# Patient Record
Sex: FEMALE | Race: WHITE | Employment: OTHER | URBAN - NONMETROPOLITAN AREA
[De-identification: names, ages, dates, MRNs, and addresses within clinical notes are randomized per-mention and may not be internally consistent; named-entity substitution may affect disease eponyms.]

---

## 2017-01-30 ENCOUNTER — DOCTOR'S OFFICE (OUTPATIENT)
Dept: URBAN - NONMETROPOLITAN AREA CLINIC 1 | Facility: CLINIC | Age: 72
Setting detail: OPHTHALMOLOGY
End: 2017-01-30
Payer: COMMERCIAL

## 2017-01-30 ENCOUNTER — DOCTOR'S OFFICE (OUTPATIENT)
Dept: URBAN - NONMETROPOLITAN AREA CLINIC 1 | Facility: CLINIC | Age: 72
Setting detail: OPHTHALMOLOGY
End: 2017-01-30

## 2017-01-30 DIAGNOSIS — H52.4: ICD-10-CM

## 2017-01-30 DIAGNOSIS — H43.811: ICD-10-CM

## 2017-01-30 DIAGNOSIS — H43.812: ICD-10-CM

## 2017-01-30 DIAGNOSIS — H25.13: ICD-10-CM

## 2017-01-30 DIAGNOSIS — D31.32: ICD-10-CM

## 2017-01-30 DIAGNOSIS — H04.122: ICD-10-CM

## 2017-01-30 DIAGNOSIS — H35.3131: ICD-10-CM

## 2017-01-30 DIAGNOSIS — H04.121: ICD-10-CM

## 2017-01-30 PROCEDURE — 92134 CPTRZ OPH DX IMG PST SGM RTA: CPT | Performed by: OPHTHALMOLOGY

## 2017-01-30 PROCEDURE — 92014 COMPRE OPH EXAM EST PT 1/>: CPT | Performed by: OPHTHALMOLOGY

## 2017-01-30 PROCEDURE — VITAEYE VITA EYE VITAMINS: Performed by: OPHTHALMOLOGY

## 2017-01-30 PROCEDURE — 92226 OPHTHALMOSCOPY EXT SUBSEQUENT: CPT | Performed by: OPHTHALMOLOGY

## 2017-01-30 ASSESSMENT — REFRACTION_CURRENTRX
OS_SPHERE: +3.00
OD_OVR_VA: 20/
OS_AXIS: 088
OS_OVR_VA: 20/
OS_ADD: +2.50
OD_OVR_VA: 20/
OD_AXIS: 106
OD_ADD: +2.50
OS_VPRISM_DIRECTION: PROGS
OD_CYLINDER: -2.50
OD_SPHERE: +3.25
OD_OVR_VA: 20/
OD_VPRISM_DIRECTION: PROGS
OS_CYLINDER: -2.50

## 2017-01-30 ASSESSMENT — REFRACTION_MANIFEST
OS_VA1: 20/
OS_VA3: 20/
OS_VA3: 20/
OS_VA1: 20/
OD_VA1: 20/
OU_VA: 20/
OD_VA3: 20/
OS_VA2: 20/
OD_VA2: 20/
OU_VA: 20/
OD_VA2: 20/
OS_VA3: 20/
OS_VA2: 20/
OD_VA2: 20/
OS_VA2: 20/
OD_VA1: 20/
OU_VA: 20/
OD_VA1: 20/
OS_VA1: 20/
OD_VA3: 20/
OD_VA3: 20/

## 2017-01-30 ASSESSMENT — VISUAL ACUITY
OS_BCVA: 20/30-2
OD_BCVA: 20/30+2

## 2017-01-30 ASSESSMENT — SUPERFICIAL PUNCTATE KERATITIS (SPK)
OD_SPK: 1+
OS_SPK: 1+

## 2017-01-30 ASSESSMENT — CONFRONTATIONAL VISUAL FIELD TEST (CVF)
OD_FINDINGS: FULL
OS_FINDINGS: FULL

## 2017-01-30 ASSESSMENT — LID POSITION - DERMATOCHALASIS
OS_DERMATOCHALASIS: LUL 1+
OD_DERMATOCHALASIS: RUL 1+

## 2017-04-04 ENCOUNTER — DOCTOR'S OFFICE (OUTPATIENT)
Dept: URBAN - NONMETROPOLITAN AREA CLINIC 1 | Facility: CLINIC | Age: 72
Setting detail: OPHTHALMOLOGY
End: 2017-04-04
Payer: COMMERCIAL

## 2017-04-04 DIAGNOSIS — I63.50: ICD-10-CM

## 2017-04-04 PROCEDURE — 92083 EXTENDED VISUAL FIELD XM: CPT | Performed by: OPHTHALMOLOGY

## 2017-04-27 ENCOUNTER — DOCTOR'S OFFICE (OUTPATIENT)
Dept: URBAN - NONMETROPOLITAN AREA CLINIC 1 | Facility: CLINIC | Age: 72
Setting detail: OPHTHALMOLOGY
End: 2017-04-27

## 2017-04-27 ENCOUNTER — DOCTOR'S OFFICE (OUTPATIENT)
Dept: URBAN - NONMETROPOLITAN AREA CLINIC 1 | Facility: CLINIC | Age: 72
Setting detail: OPHTHALMOLOGY
End: 2017-04-27
Payer: COMMERCIAL

## 2017-04-27 DIAGNOSIS — H35.3131: ICD-10-CM

## 2017-04-27 DIAGNOSIS — H04.121: ICD-10-CM

## 2017-04-27 DIAGNOSIS — H52.4: ICD-10-CM

## 2017-04-27 DIAGNOSIS — H04.122: ICD-10-CM

## 2017-04-27 DIAGNOSIS — I63.50: ICD-10-CM

## 2017-04-27 DIAGNOSIS — D31.32: ICD-10-CM

## 2017-04-27 DIAGNOSIS — H43.812: ICD-10-CM

## 2017-04-27 DIAGNOSIS — H43.811: ICD-10-CM

## 2017-04-27 DIAGNOSIS — H25.13: ICD-10-CM

## 2017-04-27 PROCEDURE — 92134 CPTRZ OPH DX IMG PST SGM RTA: CPT | Performed by: OPHTHALMOLOGY

## 2017-04-27 PROCEDURE — VITAEYE VITA EYE VITAMINS: Performed by: OPHTHALMOLOGY

## 2017-04-27 PROCEDURE — 92014 COMPRE OPH EXAM EST PT 1/>: CPT | Performed by: OPHTHALMOLOGY

## 2017-04-27 PROCEDURE — 92226 OPHTHALMOSCOPY EXT SUBSEQUENT: CPT | Performed by: OPHTHALMOLOGY

## 2017-04-27 ASSESSMENT — REFRACTION_CURRENTRX
OD_SPHERE: +3.25
OS_OVR_VA: 20/
OD_ADD: +2.50
OS_AXIS: 088
OS_CYLINDER: -2.50
OD_OVR_VA: 20/
OS_OVR_VA: 20/
OD_OVR_VA: 20/
OS_SPHERE: +3.00
OS_ADD: +2.50
OD_OVR_VA: 20/
OS_OVR_VA: 20/
OS_VPRISM_DIRECTION: PROGS
OD_VPRISM_DIRECTION: PROGS
OD_CYLINDER: -2.50
OD_AXIS: 106

## 2017-04-27 ASSESSMENT — REFRACTION_MANIFEST
OS_VA1: 20/
OD_VA1: 20/
OD_VA1: 20/
OS_VA3: 20/
OD_VA2: 20/
OD_VA3: 20/
OS_VA3: 20/
OU_VA: 20/
OD_VA3: 20/
OS_VA1: 20/
OS_VA1: 20/
OU_VA: 20/
OD_VA1: 20/
OD_VA2: 20/
OU_VA: 20/
OS_VA2: 20/
OS_VA3: 20/
OS_VA2: 20/
OD_VA2: 20/
OD_VA3: 20/
OS_VA2: 20/

## 2017-04-27 ASSESSMENT — CONFRONTATIONAL VISUAL FIELD TEST (CVF)
OD_FINDINGS: FULL
OS_FINDINGS: FULL

## 2017-04-27 ASSESSMENT — VISUAL ACUITY
OS_BCVA: 20/25-2
OD_BCVA: 20/25-2

## 2017-04-27 ASSESSMENT — LID POSITION - DERMATOCHALASIS
OS_DERMATOCHALASIS: LUL 1+
OD_DERMATOCHALASIS: RUL 1+

## 2017-04-27 ASSESSMENT — SUPERFICIAL PUNCTATE KERATITIS (SPK)
OS_SPK: 1+
OD_SPK: 1+

## 2017-08-21 ENCOUNTER — DOCTOR'S OFFICE (OUTPATIENT)
Dept: URBAN - NONMETROPOLITAN AREA CLINIC 1 | Facility: CLINIC | Age: 72
Setting detail: OPHTHALMOLOGY
End: 2017-08-21

## 2017-08-21 DIAGNOSIS — H52.4: ICD-10-CM

## 2017-08-21 PROCEDURE — VITAEYE VITA EYE VITAMINS: Performed by: OPHTHALMOLOGY

## 2017-08-24 ENCOUNTER — DOCTOR'S OFFICE (OUTPATIENT)
Dept: URBAN - NONMETROPOLITAN AREA CLINIC 1 | Facility: CLINIC | Age: 72
Setting detail: OPHTHALMOLOGY
End: 2017-08-24
Payer: COMMERCIAL

## 2017-08-24 DIAGNOSIS — H43.813: ICD-10-CM

## 2017-08-24 DIAGNOSIS — H43.812: ICD-10-CM

## 2017-08-24 DIAGNOSIS — H04.123: ICD-10-CM

## 2017-08-24 DIAGNOSIS — H35.3131: ICD-10-CM

## 2017-08-24 DIAGNOSIS — H04.122: ICD-10-CM

## 2017-08-24 DIAGNOSIS — H25.13: ICD-10-CM

## 2017-08-24 DIAGNOSIS — D31.32: ICD-10-CM

## 2017-08-24 DIAGNOSIS — H04.121: ICD-10-CM

## 2017-08-24 DIAGNOSIS — H43.811: ICD-10-CM

## 2017-08-24 DIAGNOSIS — I63.50: ICD-10-CM

## 2017-08-24 PROCEDURE — 83861 MICROFLUID ANALY TEARS: CPT | Performed by: OPHTHALMOLOGY

## 2017-08-24 PROCEDURE — 92014 COMPRE OPH EXAM EST PT 1/>: CPT | Performed by: OPHTHALMOLOGY

## 2017-08-24 PROCEDURE — 92134 CPTRZ OPH DX IMG PST SGM RTA: CPT | Performed by: OPHTHALMOLOGY

## 2017-08-24 PROCEDURE — 92226 OPHTHALMOSCOPY EXT SUBSEQUENT: CPT | Performed by: OPHTHALMOLOGY

## 2017-08-24 ASSESSMENT — REFRACTION_CURRENTRX
OS_VPRISM_DIRECTION: PROGS
OD_OVR_VA: 20/
OS_SPHERE: +3.00
OS_OVR_VA: 20/
OD_ADD: +2.50
OS_CYLINDER: -2.50
OD_VPRISM_DIRECTION: PROGS
OS_ADD: +2.50
OS_OVR_VA: 20/
OS_OVR_VA: 20/
OD_SPHERE: +3.25
OS_AXIS: 088
OD_AXIS: 106
OD_CYLINDER: -2.50

## 2017-08-24 ASSESSMENT — REFRACTION_MANIFEST
OU_VA: 20/
OD_VA2: 20/
OS_VA1: 20/
OS_VA3: 20/
OD_VA2: 20/
OS_VA2: 20/
OS_VA3: 20/
OD_VA2: 20/
OD_VA1: 20/
OS_VA2: 20/
OS_VA1: 20/
OD_VA3: 20/
OS_VA1: 20/
OU_VA: 20/
OD_VA3: 20/
OU_VA: 20/
OS_VA2: 20/
OD_VA1: 20/
OD_VA1: 20/
OS_VA3: 20/
OD_VA3: 20/

## 2017-08-24 ASSESSMENT — LID POSITION - DERMATOCHALASIS
OS_DERMATOCHALASIS: LUL 1+
OD_DERMATOCHALASIS: RUL 1+

## 2017-08-24 ASSESSMENT — CONFRONTATIONAL VISUAL FIELD TEST (CVF)
OS_FINDINGS: FULL
OD_FINDINGS: FULL

## 2017-08-24 ASSESSMENT — VISUAL ACUITY
OS_BCVA: 20/25-2
OD_BCVA: 20/25-1

## 2017-08-24 ASSESSMENT — SUPERFICIAL PUNCTATE KERATITIS (SPK)
OD_SPK: 1+
OS_SPK: 1+

## 2017-11-22 ENCOUNTER — DOCTOR'S OFFICE (OUTPATIENT)
Dept: URBAN - METROPOLITAN AREA CLINIC 136 | Facility: CLINIC | Age: 72
Setting detail: OPHTHALMOLOGY
End: 2017-11-22
Payer: COMMERCIAL

## 2017-11-22 DIAGNOSIS — H35.3130: ICD-10-CM

## 2017-11-22 PROCEDURE — VITAEYE VITA EYE VITAMINS: Performed by: OPHTHALMOLOGY

## 2017-12-14 ENCOUNTER — DOCTOR'S OFFICE (OUTPATIENT)
Dept: URBAN - NONMETROPOLITAN AREA CLINIC 1 | Facility: CLINIC | Age: 72
Setting detail: OPHTHALMOLOGY
End: 2017-12-14
Payer: COMMERCIAL

## 2017-12-14 DIAGNOSIS — D31.32: ICD-10-CM

## 2017-12-14 DIAGNOSIS — H43.813: ICD-10-CM

## 2017-12-14 DIAGNOSIS — H25.13: ICD-10-CM

## 2017-12-14 DIAGNOSIS — H35.3131: ICD-10-CM

## 2017-12-14 DIAGNOSIS — H04.122: ICD-10-CM

## 2017-12-14 DIAGNOSIS — H43.812: ICD-10-CM

## 2017-12-14 DIAGNOSIS — H43.811: ICD-10-CM

## 2017-12-14 DIAGNOSIS — H04.121: ICD-10-CM

## 2017-12-14 DIAGNOSIS — I63.50: ICD-10-CM

## 2017-12-14 DIAGNOSIS — H04.123: ICD-10-CM

## 2017-12-14 PROCEDURE — 92014 COMPRE OPH EXAM EST PT 1/>: CPT | Performed by: OPHTHALMOLOGY

## 2017-12-14 PROCEDURE — 83861 MICROFLUID ANALY TEARS: CPT | Performed by: OPHTHALMOLOGY

## 2017-12-14 PROCEDURE — 92226 OPHTHALMOSCOPY EXT SUBSEQUENT: CPT | Performed by: OPHTHALMOLOGY

## 2017-12-14 PROCEDURE — 92134 CPTRZ OPH DX IMG PST SGM RTA: CPT | Performed by: OPHTHALMOLOGY

## 2017-12-14 ASSESSMENT — REFRACTION_MANIFEST
OS_VA3: 20/
OS_VA3: 20/
OS_VA1: 20/
OD_VA3: 20/
OD_VA2: 20/
OD_VA1: 20/
OS_VA3: 20/
OS_VA1: 20/
OD_VA1: 20/
OS_VA1: 20/
OU_VA: 20/
OS_VA2: 20/
OS_VA2: 20/
OD_VA1: 20/
OU_VA: 20/
OD_VA2: 20/
OD_VA2: 20/
OD_VA3: 20/
OD_VA3: 20/
OU_VA: 20/
OS_VA2: 20/

## 2017-12-14 ASSESSMENT — REFRACTION_CURRENTRX
OS_AXIS: 088
OS_CYLINDER: -2.50
OS_VPRISM_DIRECTION: PROGS
OS_ADD: +2.50
OD_OVR_VA: 20/
OS_OVR_VA: 20/
OD_VPRISM_DIRECTION: PROGS
OD_OVR_VA: 20/
OD_SPHERE: +3.25
OD_CYLINDER: -2.50
OS_OVR_VA: 20/
OD_ADD: +2.50
OS_OVR_VA: 20/
OD_OVR_VA: 20/
OS_SPHERE: +3.00
OD_AXIS: 106

## 2017-12-14 ASSESSMENT — VISUAL ACUITY
OS_BCVA: 20/40-1
OD_BCVA: 20/40

## 2017-12-14 ASSESSMENT — LID POSITION - DERMATOCHALASIS
OD_DERMATOCHALASIS: RUL 1+
OS_DERMATOCHALASIS: LUL 1+

## 2017-12-14 ASSESSMENT — SUPERFICIAL PUNCTATE KERATITIS (SPK)
OD_SPK: 1+
OS_SPK: 1+

## 2017-12-14 ASSESSMENT — CONFRONTATIONAL VISUAL FIELD TEST (CVF)
OD_FINDINGS: FULL
OS_FINDINGS: FULL

## 2017-12-29 ENCOUNTER — DOCTOR'S OFFICE (OUTPATIENT)
Dept: URBAN - NONMETROPOLITAN AREA CLINIC 1 | Facility: CLINIC | Age: 72
Setting detail: OPHTHALMOLOGY
End: 2017-12-29
Payer: COMMERCIAL

## 2017-12-29 DIAGNOSIS — I63.50: ICD-10-CM

## 2017-12-29 PROCEDURE — 92083 EXTENDED VISUAL FIELD XM: CPT | Performed by: OPHTHALMOLOGY

## 2018-01-18 ENCOUNTER — DOCTOR'S OFFICE (OUTPATIENT)
Dept: URBAN - NONMETROPOLITAN AREA CLINIC 1 | Facility: CLINIC | Age: 73
Setting detail: OPHTHALMOLOGY
End: 2018-01-18

## 2018-01-18 ENCOUNTER — DOCTOR'S OFFICE (OUTPATIENT)
Dept: URBAN - NONMETROPOLITAN AREA CLINIC 1 | Facility: CLINIC | Age: 73
Setting detail: OPHTHALMOLOGY
End: 2018-01-18
Payer: COMMERCIAL

## 2018-01-18 DIAGNOSIS — H35.3131: ICD-10-CM

## 2018-01-18 DIAGNOSIS — H52.7: ICD-10-CM

## 2018-01-18 DIAGNOSIS — H04.122: ICD-10-CM

## 2018-01-18 DIAGNOSIS — H04.121: ICD-10-CM

## 2018-01-18 DIAGNOSIS — I63.50: ICD-10-CM

## 2018-01-18 DIAGNOSIS — D31.32: ICD-10-CM

## 2018-01-18 DIAGNOSIS — H25.13: ICD-10-CM

## 2018-01-18 PROCEDURE — 83861 MICROFLUID ANALY TEARS: CPT | Performed by: OPHTHALMOLOGY

## 2018-01-18 PROCEDURE — 92134 CPTRZ OPH DX IMG PST SGM RTA: CPT | Performed by: OPHTHALMOLOGY

## 2018-01-18 PROCEDURE — 92014 COMPRE OPH EXAM EST PT 1/>: CPT | Performed by: OPHTHALMOLOGY

## 2018-01-18 PROCEDURE — VITAEYE VITA EYE VITAMINS: Performed by: OPHTHALMOLOGY

## 2018-01-18 ASSESSMENT — LID POSITION - DERMATOCHALASIS
OD_DERMATOCHALASIS: RUL 1+
OS_DERMATOCHALASIS: LUL 1+

## 2018-01-18 ASSESSMENT — REFRACTION_MANIFEST
OD_VA1: 20/
OD_VA3: 20/
OU_VA: 20/
OD_VA3: 20/
OS_VA1: 20/
OD_VA3: 20/
OU_VA: 20/
OS_VA3: 20/
OU_VA: 20/
OD_VA1: 20/
OD_VA2: 20/
OS_VA2: 20/
OS_VA3: 20/
OD_VA1: 20/
OS_VA2: 20/
OD_VA2: 20/
OS_VA3: 20/
OS_VA2: 20/
OD_VA2: 20/

## 2018-01-18 ASSESSMENT — REFRACTION_CURRENTRX
OD_ADD: +2.50
OS_SPHERE: +3.00
OS_OVR_VA: 20/
OS_OVR_VA: 20/
OD_AXIS: 106
OS_OVR_VA: 20/
OD_VPRISM_DIRECTION: PROGS
OS_ADD: +2.50
OD_CYLINDER: -2.50
OS_VPRISM_DIRECTION: PROGS
OD_OVR_VA: 20/
OD_SPHERE: +3.25
OD_OVR_VA: 20/
OS_CYLINDER: -2.50
OS_AXIS: 088
OD_OVR_VA: 20/

## 2018-01-18 ASSESSMENT — CONFRONTATIONAL VISUAL FIELD TEST (CVF)
OS_FINDINGS: FULL
OD_FINDINGS: FULL

## 2018-01-18 ASSESSMENT — SUPERFICIAL PUNCTATE KERATITIS (SPK)
OS_SPK: 1+
OD_SPK: 1+

## 2018-01-18 ASSESSMENT — VISUAL ACUITY
OD_BCVA: 20/30
OS_BCVA: 20/30

## 2018-04-16 ENCOUNTER — DOCTOR'S OFFICE (OUTPATIENT)
Dept: URBAN - NONMETROPOLITAN AREA CLINIC 1 | Facility: CLINIC | Age: 73
Setting detail: OPHTHALMOLOGY
End: 2018-04-16
Payer: COMMERCIAL

## 2018-04-16 ENCOUNTER — RX ONLY (RX ONLY)
Age: 73
End: 2018-04-16

## 2018-04-16 DIAGNOSIS — I63.50: ICD-10-CM

## 2018-04-16 DIAGNOSIS — H43.812: ICD-10-CM

## 2018-04-16 DIAGNOSIS — H35.3131: ICD-10-CM

## 2018-04-16 DIAGNOSIS — H43.813: ICD-10-CM

## 2018-04-16 DIAGNOSIS — E11.3293: ICD-10-CM

## 2018-04-16 DIAGNOSIS — D31.32: ICD-10-CM

## 2018-04-16 DIAGNOSIS — I63.9: ICD-10-CM

## 2018-04-16 DIAGNOSIS — H04.123: ICD-10-CM

## 2018-04-16 DIAGNOSIS — H43.811: ICD-10-CM

## 2018-04-16 DIAGNOSIS — H25.13: ICD-10-CM

## 2018-04-16 DIAGNOSIS — H04.122: ICD-10-CM

## 2018-04-16 DIAGNOSIS — H04.121: ICD-10-CM

## 2018-04-16 PROCEDURE — 83861 MICROFLUID ANALY TEARS: CPT | Performed by: OPHTHALMOLOGY

## 2018-04-16 PROCEDURE — 92014 COMPRE OPH EXAM EST PT 1/>: CPT | Performed by: OPHTHALMOLOGY

## 2018-04-16 PROCEDURE — 92134 CPTRZ OPH DX IMG PST SGM RTA: CPT | Performed by: OPHTHALMOLOGY

## 2018-04-16 PROCEDURE — 92226 OPHTHALMOSCOPY EXT SUBSEQUENT: CPT | Performed by: OPHTHALMOLOGY

## 2018-04-16 ASSESSMENT — CONFRONTATIONAL VISUAL FIELD TEST (CVF)
OS_FINDINGS: FULL
OD_FINDINGS: FULL

## 2018-04-16 ASSESSMENT — REFRACTION_CURRENTRX
OD_AXIS: 106
OS_OVR_VA: 20/
OS_ADD: +2.50
OD_OVR_VA: 20/
OS_VPRISM_DIRECTION: PROGS
OD_VPRISM_DIRECTION: PROGS
OD_ADD: +2.50
OD_SPHERE: +3.25
OD_OVR_VA: 20/
OS_CYLINDER: -2.50
OS_OVR_VA: 20/
OS_OVR_VA: 20/
OD_OVR_VA: 20/
OS_SPHERE: +3.00
OS_AXIS: 088
OD_CYLINDER: -2.50

## 2018-04-16 ASSESSMENT — REFRACTION_MANIFEST
OS_VA3: 20/
OD_VA1: 20/
OS_VA1: 20/
OD_VA3: 20/
OS_VA2: 20/
OD_VA3: 20/
OS_VA1: 20/
OU_VA: 20/
OS_VA2: 20/
OS_VA3: 20/
OU_VA: 20/
OD_VA1: 20/
OS_VA2: 20/
OD_VA3: 20/
OD_VA2: 20/
OD_VA2: 20/
OS_VA1: 20/
OS_VA3: 20/
OD_VA2: 20/
OU_VA: 20/
OD_VA1: 20/

## 2018-04-16 ASSESSMENT — VISUAL ACUITY
OD_BCVA: 20/25-1
OS_BCVA: 20/25-2

## 2018-04-16 ASSESSMENT — SUPERFICIAL PUNCTATE KERATITIS (SPK)
OS_SPK: 1+
OD_SPK: 1+

## 2018-04-16 ASSESSMENT — LID POSITION - DERMATOCHALASIS
OD_DERMATOCHALASIS: RUL 1+
OS_DERMATOCHALASIS: LUL 1+

## 2018-06-01 ENCOUNTER — DOCTOR'S OFFICE (OUTPATIENT)
Dept: URBAN - NONMETROPOLITAN AREA CLINIC 1 | Facility: CLINIC | Age: 73
Setting detail: OPHTHALMOLOGY
End: 2018-06-01

## 2018-06-01 DIAGNOSIS — H52.7: ICD-10-CM

## 2018-06-01 PROCEDURE — VITAEYE VITA EYE VITAMINS: Performed by: OPHTHALMOLOGY

## 2018-07-12 ENCOUNTER — DOCTOR'S OFFICE (OUTPATIENT)
Dept: URBAN - NONMETROPOLITAN AREA CLINIC 1 | Facility: CLINIC | Age: 73
Setting detail: OPHTHALMOLOGY
End: 2018-07-12
Payer: COMMERCIAL

## 2018-07-12 DIAGNOSIS — H43.812: ICD-10-CM

## 2018-07-12 DIAGNOSIS — H43.813: ICD-10-CM

## 2018-07-12 DIAGNOSIS — E11.3292: ICD-10-CM

## 2018-07-12 DIAGNOSIS — E11.3293: ICD-10-CM

## 2018-07-12 DIAGNOSIS — H04.121: ICD-10-CM

## 2018-07-12 DIAGNOSIS — H25.13: ICD-10-CM

## 2018-07-12 DIAGNOSIS — H43.811: ICD-10-CM

## 2018-07-12 DIAGNOSIS — H04.122: ICD-10-CM

## 2018-07-12 DIAGNOSIS — H35.3131: ICD-10-CM

## 2018-07-12 DIAGNOSIS — I63.9: ICD-10-CM

## 2018-07-12 DIAGNOSIS — H04.123: ICD-10-CM

## 2018-07-12 DIAGNOSIS — D31.32: ICD-10-CM

## 2018-07-12 PROCEDURE — 92226 OPHTHALMOSCOPY EXT SUBSEQUENT: CPT | Performed by: OPHTHALMOLOGY

## 2018-07-12 PROCEDURE — 92014 COMPRE OPH EXAM EST PT 1/>: CPT | Performed by: OPHTHALMOLOGY

## 2018-07-12 PROCEDURE — 92134 CPTRZ OPH DX IMG PST SGM RTA: CPT | Performed by: OPHTHALMOLOGY

## 2018-07-12 PROCEDURE — 83861 MICROFLUID ANALY TEARS: CPT | Performed by: OPHTHALMOLOGY

## 2018-07-12 ASSESSMENT — REFRACTION_MANIFEST
OD_VA2: 20/
OU_VA: 20/
OS_VA1: 20/
OS_VA2: 20/
OD_VA1: 20/
OD_VA3: 20/
OU_VA: 20/
OS_VA3: 20/
OD_VA3: 20/
OS_VA3: 20/
OD_VA1: 20/
OS_VA3: 20/
OS_VA1: 20/
OS_VA2: 20/
OD_VA2: 20/
OD_VA3: 20/
OD_VA2: 20/
OS_VA2: 20/
OS_VA1: 20/
OU_VA: 20/
OD_VA1: 20/

## 2018-07-12 ASSESSMENT — REFRACTION_CURRENTRX
OD_AXIS: 106
OD_OVR_VA: 20/
OS_OVR_VA: 20/
OS_SPHERE: +3.00
OS_VPRISM_DIRECTION: PROGS
OS_OVR_VA: 20/
OD_OVR_VA: 20/
OS_ADD: +2.50
OS_CYLINDER: -2.50
OS_OVR_VA: 20/
OD_OVR_VA: 20/
OD_ADD: +2.50
OS_AXIS: 088
OD_SPHERE: +3.25
OD_VPRISM_DIRECTION: PROGS
OD_CYLINDER: -2.50

## 2018-07-12 ASSESSMENT — VISUAL ACUITY
OD_BCVA: 20/40+2
OS_BCVA: 20/30+2

## 2018-07-12 ASSESSMENT — SUPERFICIAL PUNCTATE KERATITIS (SPK)
OD_SPK: 1+
OS_SPK: 1+

## 2018-07-12 ASSESSMENT — LID POSITION - DERMATOCHALASIS
OS_DERMATOCHALASIS: LUL 1+
OD_DERMATOCHALASIS: RUL 1+

## 2018-07-12 ASSESSMENT — CONFRONTATIONAL VISUAL FIELD TEST (CVF)
OD_FINDINGS: FULL
OS_FINDINGS: FULL

## 2018-08-23 ENCOUNTER — DOCTOR'S OFFICE (OUTPATIENT)
Dept: URBAN - NONMETROPOLITAN AREA CLINIC 1 | Facility: CLINIC | Age: 73
Setting detail: OPHTHALMOLOGY
End: 2018-08-23
Payer: COMMERCIAL

## 2018-08-23 DIAGNOSIS — H43.812: ICD-10-CM

## 2018-08-23 DIAGNOSIS — H04.121: ICD-10-CM

## 2018-08-23 DIAGNOSIS — H25.13: ICD-10-CM

## 2018-08-23 DIAGNOSIS — D31.32: ICD-10-CM

## 2018-08-23 DIAGNOSIS — H43.813: ICD-10-CM

## 2018-08-23 DIAGNOSIS — H04.122: ICD-10-CM

## 2018-08-23 DIAGNOSIS — H35.3131: ICD-10-CM

## 2018-08-23 DIAGNOSIS — H43.811: ICD-10-CM

## 2018-08-23 DIAGNOSIS — I63.9: ICD-10-CM

## 2018-08-23 DIAGNOSIS — E11.3293: ICD-10-CM

## 2018-08-23 DIAGNOSIS — H04.123: ICD-10-CM

## 2018-08-23 PROCEDURE — 83861 MICROFLUID ANALY TEARS: CPT | Performed by: OPHTHALMOLOGY

## 2018-08-23 PROCEDURE — 92014 COMPRE OPH EXAM EST PT 1/>: CPT | Performed by: OPHTHALMOLOGY

## 2018-08-23 PROCEDURE — 92134 CPTRZ OPH DX IMG PST SGM RTA: CPT | Performed by: OPHTHALMOLOGY

## 2018-08-23 PROCEDURE — 92226 OPHTHALMOSCOPY EXT SUBSEQUENT: CPT | Performed by: OPHTHALMOLOGY

## 2018-08-23 ASSESSMENT — REFRACTION_MANIFEST
OS_VA2: 20/
OD_VA1: 20/
OU_VA: 20/
OS_VA2: 20/
OD_VA1: 20/
OD_VA2: 20/
OS_VA1: 20/
OD_VA2: 20/
OD_VA3: 20/
OS_VA3: 20/
OD_VA2: 20/
OS_VA1: 20/
OS_VA3: 20/
OS_VA2: 20/
OU_VA: 20/
OD_VA3: 20/
OS_VA3: 20/
OD_VA1: 20/
OU_VA: 20/
OS_VA1: 20/
OD_VA3: 20/

## 2018-08-23 ASSESSMENT — REFRACTION_CURRENTRX
OS_CYLINDER: -2.50
OS_VPRISM_DIRECTION: PROGS
OS_OVR_VA: 20/
OS_OVR_VA: 20/
OD_OVR_VA: 20/
OD_CYLINDER: -2.50
OD_OVR_VA: 20/
OS_OVR_VA: 20/
OD_SPHERE: +3.25
OD_AXIS: 106
OS_SPHERE: +3.00
OS_ADD: +2.50
OD_ADD: +2.50
OS_AXIS: 088
OD_VPRISM_DIRECTION: PROGS
OD_OVR_VA: 20/

## 2018-08-23 ASSESSMENT — LID POSITION - DERMATOCHALASIS
OS_DERMATOCHALASIS: LUL 1+
OD_DERMATOCHALASIS: RUL 1+

## 2018-08-23 ASSESSMENT — CONFRONTATIONAL VISUAL FIELD TEST (CVF)
OD_FINDINGS: FULL
OS_FINDINGS: FULL

## 2018-08-23 ASSESSMENT — DECREASING TEAR LAKE - SEVERITY SCORE
OS_DEC_TEARLAKE: 2+
OD_DEC_TEARLAKE: 2+

## 2018-08-23 ASSESSMENT — SUPERFICIAL PUNCTATE KERATITIS (SPK)
OD_SPK: 1+
OS_SPK: 1+

## 2018-08-23 ASSESSMENT — VISUAL ACUITY
OD_BCVA: 20/25-1
OS_BCVA: 20/30+2

## 2018-09-24 ENCOUNTER — RX ONLY (RX ONLY)
Age: 73
End: 2018-09-24

## 2018-09-24 ENCOUNTER — DOCTOR'S OFFICE (OUTPATIENT)
Dept: URBAN - NONMETROPOLITAN AREA CLINIC 1 | Facility: CLINIC | Age: 73
Setting detail: OPHTHALMOLOGY
End: 2018-09-24
Payer: COMMERCIAL

## 2018-09-24 DIAGNOSIS — E11.3293: ICD-10-CM

## 2018-09-24 DIAGNOSIS — H04.121: ICD-10-CM

## 2018-09-24 DIAGNOSIS — H04.123: ICD-10-CM

## 2018-09-24 DIAGNOSIS — H35.3131: ICD-10-CM

## 2018-09-24 DIAGNOSIS — H04.122: ICD-10-CM

## 2018-09-24 DIAGNOSIS — H25.13: ICD-10-CM

## 2018-09-24 PROCEDURE — 83861 MICROFLUID ANALY TEARS: CPT | Performed by: OPHTHALMOLOGY

## 2018-09-24 PROCEDURE — 92012 INTRM OPH EXAM EST PATIENT: CPT | Performed by: OPHTHALMOLOGY

## 2018-09-24 PROCEDURE — 92134 CPTRZ OPH DX IMG PST SGM RTA: CPT | Performed by: OPHTHALMOLOGY

## 2018-09-24 ASSESSMENT — REFRACTION_MANIFEST
OS_VA2: 20/
OD_VA3: 20/
OU_VA: 20/
OD_VA1: 20/
OD_VA2: 20/
OS_VA3: 20/
OS_VA1: 20/
OD_VA1: 20/
OS_VA2: 20/
OD_VA3: 20/
OS_VA1: 20/
OD_VA2: 20/
OS_VA3: 20/
OU_VA: 20/

## 2018-09-24 ASSESSMENT — REFRACTION_CURRENTRX
OD_VPRISM_DIRECTION: PROGS
OD_AXIS: 106
OD_SPHERE: +3.25
OS_VPRISM_DIRECTION: PROGS
OS_CYLINDER: -2.50
OD_ADD: +2.50
OD_OVR_VA: 20/
OS_AXIS: 088
OD_CYLINDER: -2.50
OD_OVR_VA: 20/
OS_OVR_VA: 20/
OD_OVR_VA: 20/
OS_OVR_VA: 20/
OS_OVR_VA: 20/
OS_SPHERE: +3.00
OS_ADD: +2.50

## 2018-09-24 ASSESSMENT — DECREASING TEAR LAKE - SEVERITY SCORE
OD_DEC_TEARLAKE: 2+
OS_DEC_TEARLAKE: 2+

## 2018-09-24 ASSESSMENT — CONFRONTATIONAL VISUAL FIELD TEST (CVF)
OD_FINDINGS: FULL
OS_FINDINGS: FULL

## 2018-09-24 ASSESSMENT — LID POSITION - DERMATOCHALASIS
OD_DERMATOCHALASIS: RUL 1+
OS_DERMATOCHALASIS: LUL 1+

## 2018-09-24 ASSESSMENT — VISUAL ACUITY
OS_BCVA: 20/40+2
OD_BCVA: 20/25-1

## 2018-09-24 ASSESSMENT — SUPERFICIAL PUNCTATE KERATITIS (SPK)
OD_SPK: 1+
OS_SPK: 1+

## 2018-10-24 ENCOUNTER — DOCTOR'S OFFICE (OUTPATIENT)
Dept: URBAN - NONMETROPOLITAN AREA CLINIC 1 | Facility: CLINIC | Age: 73
Setting detail: OPHTHALMOLOGY
End: 2018-10-24
Payer: COMMERCIAL

## 2018-10-24 DIAGNOSIS — H04.121: ICD-10-CM

## 2018-10-24 DIAGNOSIS — H04.122: ICD-10-CM

## 2018-10-24 PROCEDURE — 99214 OFFICE O/P EST MOD 30 MIN: CPT | Performed by: PHYSICIAN ASSISTANT

## 2018-10-24 PROCEDURE — 83861 MICROFLUID ANALY TEARS: CPT | Performed by: PHYSICIAN ASSISTANT

## 2018-10-24 ASSESSMENT — LID EXAM ASSESSMENTS
OD_BLEPHARITIS: ABSENT
OS_BLEPHARITIS: ABSENT

## 2018-10-24 ASSESSMENT — CONFRONTATIONAL VISUAL FIELD TEST (CVF)
OS_FINDINGS: FULL
OD_FINDINGS: FULL

## 2018-10-24 ASSESSMENT — LID POSITION - DERMATOCHALASIS
OD_DERMATOCHALASIS: RUL 1+
OS_DERMATOCHALASIS: LUL 1+

## 2018-10-24 ASSESSMENT — SUPERFICIAL PUNCTATE KERATITIS (SPK)
OD_SPK: T
OS_SPK: T

## 2018-10-24 ASSESSMENT — DECREASING TEAR LAKE - SEVERITY SCORE
OS_DEC_TEARLAKE: 2+
OD_DEC_TEARLAKE: 2+

## 2018-10-24 ASSESSMENT — DRY EYES - PHYSICIAN NOTES
OD_GENERALCOMMENTS: INFERIOR
OS_GENERALCOMMENTS: DIFFUSE

## 2018-10-29 ASSESSMENT — REFRACTION_MANIFEST
OD_VA1: 20/
OD_VA1: 20/
OS_VA3: 20/
OU_VA: 20/
OD_VA3: 20/
OU_VA: 20/
OS_VA3: 20/
OS_VA1: 20/
OD_VA2: 20/
OS_VA1: 20/
OD_VA3: 20/
OS_VA2: 20/
OD_VA2: 20/
OS_VA2: 20/

## 2018-10-29 ASSESSMENT — REFRACTION_CURRENTRX
OS_OVR_VA: 20/
OS_ADD: +2.50
OD_AXIS: 106
OS_OVR_VA: 20/
OD_SPHERE: +3.25
OD_OVR_VA: 20/
OD_CYLINDER: -2.50
OS_SPHERE: +3.00
OD_VPRISM_DIRECTION: PROGS
OS_CYLINDER: -2.50
OD_OVR_VA: 20/
OD_ADD: +2.50
OS_OVR_VA: 20/
OS_AXIS: 088
OS_VPRISM_DIRECTION: PROGS
OD_OVR_VA: 20/

## 2018-10-29 ASSESSMENT — VISUAL ACUITY
OD_BCVA: 20/25-2
OS_BCVA: 20/30-2

## 2018-11-28 ENCOUNTER — DOCTOR'S OFFICE (OUTPATIENT)
Dept: URBAN - NONMETROPOLITAN AREA CLINIC 1 | Facility: CLINIC | Age: 73
Setting detail: OPHTHALMOLOGY
End: 2018-11-28
Payer: COMMERCIAL

## 2018-11-28 DIAGNOSIS — H04.122: ICD-10-CM

## 2018-11-28 DIAGNOSIS — H04.121: ICD-10-CM

## 2018-11-28 DIAGNOSIS — H04.123: ICD-10-CM

## 2018-11-28 PROCEDURE — 83861 MICROFLUID ANALY TEARS: CPT | Performed by: PHYSICIAN ASSISTANT

## 2018-11-28 PROCEDURE — 99214 OFFICE O/P EST MOD 30 MIN: CPT | Performed by: PHYSICIAN ASSISTANT

## 2018-11-28 ASSESSMENT — LID POSITION - DERMATOCHALASIS
OD_DERMATOCHALASIS: RUL 1+
OS_DERMATOCHALASIS: LUL 1+

## 2018-11-28 ASSESSMENT — LID EXAM ASSESSMENTS
OS_BLEPHARITIS: ABSENT
OD_BLEPHARITIS: ABSENT

## 2018-11-28 ASSESSMENT — DRY EYES - PHYSICIAN NOTES
OS_GENERALCOMMENTS: INFERIOR
OD_GENERALCOMMENTS: INFERIOR

## 2018-11-28 ASSESSMENT — SUPERFICIAL PUNCTATE KERATITIS (SPK)
OS_SPK: T 1+
OD_SPK: 1+

## 2018-11-28 ASSESSMENT — DECREASING TEAR LAKE - SEVERITY SCORE
OD_DEC_TEARLAKE: 2+
OS_DEC_TEARLAKE: 2+

## 2018-11-29 ASSESSMENT — REFRACTION_MANIFEST
OS_VA3: 20/
OS_VA1: 20/
OU_VA: 20/
OS_VA2: 20/
OS_VA2: 20/
OD_VA2: 20/
OS_VA3: 20/
OU_VA: 20/
OD_VA3: 20/
OD_VA1: 20/
OD_VA1: 20/
OD_VA2: 20/
OS_VA1: 20/
OD_VA3: 20/

## 2018-11-29 ASSESSMENT — REFRACTION_CURRENTRX
OS_OVR_VA: 20/
OD_OVR_VA: 20/
OS_ADD: +2.50
OS_OVR_VA: 20/
OS_AXIS: 088
OD_ADD: +2.50
OS_SPHERE: +3.00
OD_CYLINDER: -2.50
OS_VPRISM_DIRECTION: PROGS
OD_OVR_VA: 20/
OD_OVR_VA: 20/
OD_SPHERE: +3.25
OD_VPRISM_DIRECTION: PROGS
OS_CYLINDER: -2.50
OS_OVR_VA: 20/
OD_AXIS: 106

## 2018-11-29 ASSESSMENT — VISUAL ACUITY
OD_BCVA: 20/30+2
OS_BCVA: 20/30-1

## 2019-05-29 ENCOUNTER — DOCTOR'S OFFICE (OUTPATIENT)
Dept: URBAN - NONMETROPOLITAN AREA CLINIC 1 | Facility: CLINIC | Age: 74
Setting detail: OPHTHALMOLOGY
End: 2019-05-29
Payer: COMMERCIAL

## 2019-05-29 DIAGNOSIS — H04.121: ICD-10-CM

## 2019-05-29 DIAGNOSIS — H04.123: ICD-10-CM

## 2019-05-29 DIAGNOSIS — H04.122: ICD-10-CM

## 2019-05-29 PROCEDURE — 83861 MICROFLUID ANALY TEARS: CPT | Performed by: PHYSICIAN ASSISTANT

## 2019-05-29 PROCEDURE — 99214 OFFICE O/P EST MOD 30 MIN: CPT | Performed by: PHYSICIAN ASSISTANT

## 2019-05-29 ASSESSMENT — DRY EYES - PHYSICIAN NOTES
OS_GENERALCOMMENTS: INFERIOR
OD_GENERALCOMMENTS: INFERIOR

## 2019-05-29 ASSESSMENT — SUPERFICIAL PUNCTATE KERATITIS (SPK)
OD_SPK: 1+
OS_SPK: T 1+

## 2019-05-29 ASSESSMENT — LID POSITION - DERMATOCHALASIS
OD_DERMATOCHALASIS: RUL 1+
OS_DERMATOCHALASIS: LUL 1+

## 2019-05-29 ASSESSMENT — LID EXAM ASSESSMENTS
OS_BLEPHARITIS: ABSENT
OD_BLEPHARITIS: ABSENT

## 2019-05-29 ASSESSMENT — DECREASING TEAR LAKE - SEVERITY SCORE
OS_DEC_TEARLAKE: 2+
OD_DEC_TEARLAKE: 2+

## 2019-05-30 ASSESSMENT — REFRACTION_MANIFEST
OD_VA3: 20/
OS_VA2: 20/
OD_VA1: 20/
OS_VA1: 20/
OD_VA3: 20/
OU_VA: 20/
OD_VA2: 20/
OS_VA3: 20/
OS_VA1: 20/
OD_VA1: 20/
OS_VA3: 20/
OU_VA: 20/
OD_VA2: 20/
OS_VA2: 20/

## 2019-05-30 ASSESSMENT — REFRACTION_CURRENTRX
OD_ADD: +2.50
OD_OVR_VA: 20/
OS_OVR_VA: 20/
OD_SPHERE: +3.25
OD_VPRISM_DIRECTION: PROGS
OD_CYLINDER: -2.50
OD_OVR_VA: 20/
OS_AXIS: 088
OS_SPHERE: +3.00
OS_OVR_VA: 20/
OD_AXIS: 106
OS_OVR_VA: 20/
OD_OVR_VA: 20/
OS_VPRISM_DIRECTION: PROGS
OS_CYLINDER: -2.50
OS_ADD: +2.50

## 2019-05-30 ASSESSMENT — VISUAL ACUITY
OD_BCVA: 20/30+2
OS_BCVA: 20/30-2

## 2019-06-10 ENCOUNTER — DOCTOR'S OFFICE (OUTPATIENT)
Dept: URBAN - NONMETROPOLITAN AREA CLINIC 1 | Facility: CLINIC | Age: 74
Setting detail: OPHTHALMOLOGY
End: 2019-06-10
Payer: COMMERCIAL

## 2019-06-10 DIAGNOSIS — H43.812: ICD-10-CM

## 2019-06-10 DIAGNOSIS — H25.13: ICD-10-CM

## 2019-06-10 DIAGNOSIS — E11.3293: ICD-10-CM

## 2019-06-10 DIAGNOSIS — H35.3131: ICD-10-CM

## 2019-06-10 DIAGNOSIS — H43.811: ICD-10-CM

## 2019-06-10 DIAGNOSIS — H43.813: ICD-10-CM

## 2019-06-10 PROCEDURE — 92226 OPHTHALMOSCOPY EXT SUBSEQUENT: CPT | Performed by: OPHTHALMOLOGY

## 2019-06-10 PROCEDURE — 92134 CPTRZ OPH DX IMG PST SGM RTA: CPT | Performed by: OPHTHALMOLOGY

## 2019-06-10 PROCEDURE — 92014 COMPRE OPH EXAM EST PT 1/>: CPT | Performed by: OPHTHALMOLOGY

## 2019-06-10 ASSESSMENT — REFRACTION_MANIFEST
OD_VA2: 20/
OS_VA1: 20/
OS_VA2: 20/
OD_VA3: 20/
OS_VA2: 20/
OU_VA: 20/
OD_VA3: 20/
OD_VA2: 20/
OS_VA3: 20/
OU_VA: 20/
OS_VA3: 20/
OD_VA1: 20/
OD_VA1: 20/
OS_VA1: 20/

## 2019-06-10 ASSESSMENT — REFRACTION_CURRENTRX
OS_AXIS: 088
OS_OVR_VA: 20/
OS_OVR_VA: 20/
OD_ADD: +2.50
OD_OVR_VA: 20/
OS_CYLINDER: -2.50
OS_SPHERE: +3.00
OS_OVR_VA: 20/
OD_CYLINDER: -2.50
OD_SPHERE: +3.25
OD_OVR_VA: 20/
OS_ADD: +2.50
OD_VPRISM_DIRECTION: PROGS
OD_OVR_VA: 20/
OS_VPRISM_DIRECTION: PROGS
OD_AXIS: 106

## 2019-06-10 ASSESSMENT — LID EXAM ASSESSMENTS
OD_BLEPHARITIS: ABSENT
OS_BLEPHARITIS: ABSENT

## 2019-06-10 ASSESSMENT — VISUAL ACUITY
OS_BCVA: 20/40-1
OD_BCVA: 20/40+2

## 2019-06-10 ASSESSMENT — LID POSITION - DERMATOCHALASIS
OD_DERMATOCHALASIS: RUL 1+
OS_DERMATOCHALASIS: LUL 1+

## 2019-06-10 ASSESSMENT — CONFRONTATIONAL VISUAL FIELD TEST (CVF)
OD_FINDINGS: FULL
OS_FINDINGS: FULL

## 2019-06-10 ASSESSMENT — DRY EYES - PHYSICIAN NOTES
OS_GENERALCOMMENTS: INFERIOR
OD_GENERALCOMMENTS: INFERIOR

## 2019-06-10 ASSESSMENT — SUPERFICIAL PUNCTATE KERATITIS (SPK)
OS_SPK: T 1+
OD_SPK: 1+

## 2019-06-10 ASSESSMENT — DECREASING TEAR LAKE - SEVERITY SCORE
OD_DEC_TEARLAKE: 2+
OS_DEC_TEARLAKE: 2+

## 2019-07-22 ENCOUNTER — RX ONLY (RX ONLY)
Age: 74
End: 2019-07-22

## 2019-07-22 ENCOUNTER — DOCTOR'S OFFICE (OUTPATIENT)
Dept: URBAN - NONMETROPOLITAN AREA CLINIC 1 | Facility: CLINIC | Age: 74
Setting detail: OPHTHALMOLOGY
End: 2019-07-22
Payer: COMMERCIAL

## 2019-07-22 DIAGNOSIS — H43.813: ICD-10-CM

## 2019-07-22 DIAGNOSIS — H04.121: ICD-10-CM

## 2019-07-22 DIAGNOSIS — H43.811: ICD-10-CM

## 2019-07-22 DIAGNOSIS — H35.3131: ICD-10-CM

## 2019-07-22 DIAGNOSIS — E11.3293: ICD-10-CM

## 2019-07-22 DIAGNOSIS — H04.122: ICD-10-CM

## 2019-07-22 DIAGNOSIS — H25.13: ICD-10-CM

## 2019-07-22 DIAGNOSIS — H43.812: ICD-10-CM

## 2019-07-22 PROCEDURE — 83861 MICROFLUID ANALY TEARS: CPT | Performed by: OPHTHALMOLOGY

## 2019-07-22 PROCEDURE — 92014 COMPRE OPH EXAM EST PT 1/>: CPT | Performed by: OPHTHALMOLOGY

## 2019-07-22 PROCEDURE — 92134 CPTRZ OPH DX IMG PST SGM RTA: CPT | Performed by: OPHTHALMOLOGY

## 2019-07-22 PROCEDURE — 92226 OPHTHALMOSCOPY EXT SUBSEQUENT: CPT | Performed by: OPHTHALMOLOGY

## 2019-07-22 ASSESSMENT — REFRACTION_CURRENTRX
OS_AXIS: 088
OD_CYLINDER: -2.50
OD_OVR_VA: 20/
OD_VPRISM_DIRECTION: PROGS
OS_OVR_VA: 20/
OS_OVR_VA: 20/
OS_ADD: +2.50
OS_VPRISM_DIRECTION: PROGS
OS_CYLINDER: -2.50
OD_SPHERE: +3.25
OS_SPHERE: +3.00
OD_OVR_VA: 20/
OD_OVR_VA: 20/
OD_AXIS: 106
OD_ADD: +2.50
OS_OVR_VA: 20/

## 2019-07-22 ASSESSMENT — LID EXAM ASSESSMENTS
OD_BLEPHARITIS: ABSENT
OS_BLEPHARITIS: ABSENT

## 2019-07-22 ASSESSMENT — DECREASING TEAR LAKE - SEVERITY SCORE
OS_DEC_TEARLAKE: 2+
OD_DEC_TEARLAKE: 2+

## 2019-07-22 ASSESSMENT — REFRACTION_MANIFEST
OS_VA3: 20/
OS_VA1: 20/
OS_VA1: 20/
OD_VA2: 20/
OD_VA2: 20/
OS_VA2: 20/
OS_VA3: 20/
OD_VA3: 20/
OS_VA2: 20/
OU_VA: 20/
OU_VA: 20/
OD_VA1: 20/
OD_VA3: 20/
OD_VA1: 20/

## 2019-07-22 ASSESSMENT — CONFRONTATIONAL VISUAL FIELD TEST (CVF)
OD_FINDINGS: FULL
OS_FINDINGS: FULL

## 2019-07-22 ASSESSMENT — DRY EYES - PHYSICIAN NOTES
OD_GENERALCOMMENTS: INFERIOR
OS_GENERALCOMMENTS: INFERIOR

## 2019-07-22 ASSESSMENT — SUPERFICIAL PUNCTATE KERATITIS (SPK)
OS_SPK: T 1+
OD_SPK: 1+

## 2019-07-22 ASSESSMENT — VISUAL ACUITY
OS_BCVA: 20/30-2
OD_BCVA: 20/40+2

## 2019-07-22 ASSESSMENT — LID POSITION - DERMATOCHALASIS
OD_DERMATOCHALASIS: RUL 1+
OS_DERMATOCHALASIS: LUL 1+

## 2019-07-29 ENCOUNTER — DOCTOR'S OFFICE (OUTPATIENT)
Dept: URBAN - NONMETROPOLITAN AREA CLINIC 1 | Facility: CLINIC | Age: 74
Setting detail: OPHTHALMOLOGY
End: 2019-07-29
Payer: COMMERCIAL

## 2019-07-29 DIAGNOSIS — H04.123: ICD-10-CM

## 2019-07-29 PROCEDURE — 99212 OFFICE O/P EST SF 10 MIN: CPT | Performed by: OPHTHALMOLOGY

## 2019-07-30 ASSESSMENT — REFRACTION_CURRENTRX
OS_OVR_VA: 20/
OS_AXIS: 088
OS_OVR_VA: 20/
OS_CYLINDER: -2.50
OS_VPRISM_DIRECTION: PROGS
OS_SPHERE: +3.00
OD_VPRISM_DIRECTION: PROGS
OD_AXIS: 106
OD_ADD: +2.50
OD_OVR_VA: 20/
OD_SPHERE: +3.25
OS_ADD: +2.50
OS_OVR_VA: 20/
OD_CYLINDER: -2.50

## 2019-07-30 ASSESSMENT — REFRACTION_MANIFEST
OS_VA2: 20/
OD_VA2: 20/
OD_VA1: 20/
OD_VA2: 20/
OS_VA2: 20/
OD_VA1: 20/
OU_VA: 20/
OS_VA1: 20/
OD_VA3: 20/
OS_VA3: 20/
OS_VA3: 20/
OD_VA3: 20/
OS_VA1: 20/
OU_VA: 20/

## 2019-07-30 ASSESSMENT — VISUAL ACUITY
OD_BCVA: 20/40+2
OS_BCVA: 20/30-2

## 2019-09-12 ENCOUNTER — DOCTOR'S OFFICE (OUTPATIENT)
Dept: URBAN - NONMETROPOLITAN AREA CLINIC 1 | Facility: CLINIC | Age: 74
Setting detail: OPHTHALMOLOGY
End: 2019-09-12
Payer: COMMERCIAL

## 2019-09-12 DIAGNOSIS — H43.811: ICD-10-CM

## 2019-09-12 DIAGNOSIS — H04.121: ICD-10-CM

## 2019-09-12 DIAGNOSIS — H35.3131: ICD-10-CM

## 2019-09-12 DIAGNOSIS — H43.812: ICD-10-CM

## 2019-09-12 DIAGNOSIS — E11.3293: ICD-10-CM

## 2019-09-12 DIAGNOSIS — H04.122: ICD-10-CM

## 2019-09-12 DIAGNOSIS — H04.123: ICD-10-CM

## 2019-09-12 DIAGNOSIS — H43.813: ICD-10-CM

## 2019-09-12 PROCEDURE — 92226 OPHTHALMOSCOPY EXT SUBSEQUENT: CPT | Performed by: OPHTHALMOLOGY

## 2019-09-12 PROCEDURE — 83861 MICROFLUID ANALY TEARS: CPT | Performed by: OPHTHALMOLOGY

## 2019-09-12 PROCEDURE — 92014 COMPRE OPH EXAM EST PT 1/>: CPT | Performed by: OPHTHALMOLOGY

## 2019-09-12 PROCEDURE — 92134 CPTRZ OPH DX IMG PST SGM RTA: CPT | Performed by: OPHTHALMOLOGY

## 2019-09-12 ASSESSMENT — REFRACTION_MANIFEST
OD_VA1: 20/
OD_VA3: 20/
OS_VA1: 20/
OU_VA: 20/
OS_VA2: 20/
OD_VA1: 20/
OD_VA2: 20/
OD_VA2: 20/
OS_VA2: 20/
OD_VA3: 20/
OS_VA1: 20/
OS_VA3: 20/
OU_VA: 20/
OS_VA3: 20/

## 2019-09-12 ASSESSMENT — REFRACTION_CURRENTRX
OS_AXIS: 088
OD_OVR_VA: 20/
OS_ADD: +2.50
OD_CYLINDER: -2.50
OD_OVR_VA: 20/
OD_ADD: +2.50
OS_OVR_VA: 20/
OD_SPHERE: +3.25
OS_OVR_VA: 20/
OS_VPRISM_DIRECTION: PROGS
OS_OVR_VA: 20/
OD_AXIS: 106
OD_OVR_VA: 20/
OS_CYLINDER: -2.50
OD_VPRISM_DIRECTION: PROGS
OS_SPHERE: +3.00

## 2019-09-12 ASSESSMENT — LID EXAM ASSESSMENTS
OD_BLEPHARITIS: ABSENT
OS_BLEPHARITIS: ABSENT

## 2019-09-12 ASSESSMENT — DRY EYES - PHYSICIAN NOTES
OS_GENERALCOMMENTS: INFERIOR
OD_GENERALCOMMENTS: INFERIOR

## 2019-09-12 ASSESSMENT — VISUAL ACUITY
OD_BCVA: 20/25-1
OS_BCVA: 20/40-1

## 2019-09-12 ASSESSMENT — DECREASING TEAR LAKE - SEVERITY SCORE
OD_DEC_TEARLAKE: 2+
OS_DEC_TEARLAKE: 2+

## 2019-09-12 ASSESSMENT — LID POSITION - DERMATOCHALASIS
OS_DERMATOCHALASIS: LUL 1+
OD_DERMATOCHALASIS: RUL 1+

## 2019-09-12 ASSESSMENT — SUPERFICIAL PUNCTATE KERATITIS (SPK)
OS_SPK: T 1+
OD_SPK: 1+

## 2019-09-12 ASSESSMENT — CONFRONTATIONAL VISUAL FIELD TEST (CVF)
OD_FINDINGS: FULL
OS_FINDINGS: FULL

## 2019-09-25 ENCOUNTER — RX ONLY (RX ONLY)
Age: 74
End: 2019-09-25

## 2019-09-25 ENCOUNTER — DOCTOR'S OFFICE (OUTPATIENT)
Dept: URBAN - NONMETROPOLITAN AREA CLINIC 1 | Facility: CLINIC | Age: 74
Setting detail: OPHTHALMOLOGY
End: 2019-09-25
Payer: COMMERCIAL

## 2019-09-25 DIAGNOSIS — H04.123: ICD-10-CM

## 2019-09-25 DIAGNOSIS — H25.013: ICD-10-CM

## 2019-09-25 DIAGNOSIS — H25.13: ICD-10-CM

## 2019-09-25 DIAGNOSIS — H35.3131: ICD-10-CM

## 2019-09-25 DIAGNOSIS — H43.813: ICD-10-CM

## 2019-09-25 DIAGNOSIS — E11.3293: ICD-10-CM

## 2019-09-25 PROCEDURE — 92014 COMPRE OPH EXAM EST PT 1/>: CPT | Performed by: OPHTHALMOLOGY

## 2019-09-25 ASSESSMENT — REFRACTION_CURRENTRX
OS_OVR_VA: 20/
OS_VPRISM_DIRECTION: PROGS
OD_SPHERE: +3.25
OD_OVR_VA: 20/
OS_AXIS: 088
OD_CYLINDER: -2.50
OD_OVR_VA: 20/
OD_ADD: +2.75
OS_OVR_VA: 20/
OD_AXIS: 106
OS_ADD: +2.75
OS_SPHERE: +3.00
OS_CYLINDER: -2.50
OD_OVR_VA: 20/
OS_OVR_VA: 20/
OD_VPRISM_DIRECTION: PROGS

## 2019-09-25 ASSESSMENT — REFRACTION_AUTOREFRACTION
OS_CYLINDER: -1.75
OD_SPHERE: +2.50
OS_SPHERE: +2.75
OD_AXIS: 096
OD_CYLINDER: -2.75
OS_AXIS: 090

## 2019-09-25 ASSESSMENT — LID POSITION - DERMATOCHALASIS
OS_DERMATOCHALASIS: LUL 1+
OD_DERMATOCHALASIS: RUL 1+

## 2019-09-25 ASSESSMENT — REFRACTION_MANIFEST
OD_VA3: 20/
OS_VA3: 20/
OS_VA2: 20/
OS_VA3: 20/
OS_VA1: 20/
OU_VA: 20/
OS_VA1: 20/
OS_VA2: 20/
OD_VA2: 20/
OU_VA: 20/
OD_VA1: 20/
OD_VA3: 20/
OD_VA2: 20/
OD_VA1: 20/

## 2019-09-25 ASSESSMENT — CONFRONTATIONAL VISUAL FIELD TEST (CVF)
OS_FINDINGS: FULL
OD_FINDINGS: FULL

## 2019-09-25 ASSESSMENT — LID EXAM ASSESSMENTS
OD_BLEPHARITIS: ABSENT
OS_BLEPHARITIS: ABSENT

## 2019-09-25 ASSESSMENT — VISUAL ACUITY
OS_BCVA: 20/50+2
OD_BCVA: 20/40-1

## 2019-09-25 ASSESSMENT — DRY EYES - PHYSICIAN NOTES
OS_GENERALCOMMENTS: KSICCA
OD_GENERALCOMMENTS: KSICCA

## 2019-09-25 ASSESSMENT — SPHEQUIV_DERIVED
OS_SPHEQUIV: 1.875
OD_SPHEQUIV: 1.125

## 2019-11-06 ENCOUNTER — DOCTOR'S OFFICE (OUTPATIENT)
Dept: URBAN - NONMETROPOLITAN AREA CLINIC 1 | Facility: CLINIC | Age: 74
Setting detail: OPHTHALMOLOGY
End: 2019-11-06
Payer: COMMERCIAL

## 2019-11-06 DIAGNOSIS — H25.011: ICD-10-CM

## 2019-11-06 DIAGNOSIS — H25.11: ICD-10-CM

## 2019-11-06 PROCEDURE — 92136 OPHTHALMIC BIOMETRY: CPT | Performed by: OPHTHALMOLOGY

## 2019-12-05 ENCOUNTER — AMBUL SURGICAL CARE (OUTPATIENT)
Dept: URBAN - NONMETROPOLITAN AREA SURGERY 1 | Facility: SURGERY | Age: 74
Setting detail: OPHTHALMOLOGY
End: 2019-12-05
Payer: COMMERCIAL

## 2019-12-05 DIAGNOSIS — H25.011: ICD-10-CM

## 2019-12-05 DIAGNOSIS — H25.11: ICD-10-CM

## 2019-12-05 PROCEDURE — G8918 PT W/O PREOP ORDER IV AB PRO: HCPCS | Performed by: OPHTHALMOLOGY

## 2019-12-05 PROCEDURE — G8918 PT W/O PREOP ORDER IV AB PRO: HCPCS | Performed by: CLINIC/CENTER

## 2019-12-05 PROCEDURE — G8907 PT DOC NO EVENTS ON DISCHARG: HCPCS | Performed by: OPHTHALMOLOGY

## 2019-12-05 PROCEDURE — 66984 XCAPSL CTRC RMVL W/O ECP: CPT | Performed by: CLINIC/CENTER

## 2019-12-05 PROCEDURE — 66984 XCAPSL CTRC RMVL W/O ECP: CPT | Performed by: OPHTHALMOLOGY

## 2019-12-05 PROCEDURE — G8907 PT DOC NO EVENTS ON DISCHARG: HCPCS | Performed by: CLINIC/CENTER

## 2019-12-06 ENCOUNTER — DOCTOR'S OFFICE (OUTPATIENT)
Dept: URBAN - NONMETROPOLITAN AREA CLINIC 1 | Facility: CLINIC | Age: 74
Setting detail: OPHTHALMOLOGY
End: 2019-12-06
Payer: COMMERCIAL

## 2019-12-06 ENCOUNTER — RX ONLY (RX ONLY)
Age: 74
End: 2019-12-06

## 2019-12-06 DIAGNOSIS — Z96.1: ICD-10-CM

## 2019-12-06 DIAGNOSIS — H25.12: ICD-10-CM

## 2019-12-06 DIAGNOSIS — H25.012: ICD-10-CM

## 2019-12-06 PROCEDURE — 99024 POSTOP FOLLOW-UP VISIT: CPT | Performed by: OPHTHALMOLOGY

## 2019-12-06 PROCEDURE — 92136 OPHTHALMIC BIOMETRY: CPT | Performed by: OPHTHALMOLOGY

## 2019-12-06 ASSESSMENT — LID POSITION - DERMATOCHALASIS
OD_DERMATOCHALASIS: RUL 1+
OS_DERMATOCHALASIS: LUL 1+

## 2019-12-06 ASSESSMENT — LID EXAM ASSESSMENTS
OD_BLEPHARITIS: ABSENT
OS_BLEPHARITIS: ABSENT

## 2019-12-06 ASSESSMENT — DRY EYES - PHYSICIAN NOTES: OS_GENERALCOMMENTS: KSICCA

## 2019-12-10 ASSESSMENT — REFRACTION_MANIFEST
OS_VA2: 20/
OS_VA2: 20/
OS_VA1: 20/
OD_VA3: 20/
OD_VA1: 20/
OS_VA1: 20/
OU_VA: 20/
OU_VA: 20/
OD_VA2: 20/
OS_VA3: 20/
OD_VA3: 20/
OD_VA1: 20/
OD_VA2: 20/
OS_VA3: 20/

## 2019-12-10 ASSESSMENT — REFRACTION_CURRENTRX
OD_CYLINDER: -2.50
OD_OVR_VA: 20/
OD_VPRISM_DIRECTION: PROGS
OS_VPRISM_DIRECTION: PROGS
OS_AXIS: 088
OD_OVR_VA: 20/
OS_OVR_VA: 20/
OD_AXIS: 106
OD_SPHERE: +3.25
OS_ADD: +2.75
OD_OVR_VA: 20/
OS_OVR_VA: 20/
OD_ADD: +2.75
OS_CYLINDER: -2.50
OS_OVR_VA: 20/
OS_SPHERE: +3.00

## 2019-12-10 ASSESSMENT — REFRACTION_AUTOREFRACTION
OD_SPHERE: +1.75
OS_CYLINDER: -2.00
OS_SPHERE: +3.00
OS_AXIS: 97
OD_AXIS: 107
OD_CYLINDER: -2.25

## 2019-12-10 ASSESSMENT — SPHEQUIV_DERIVED
OS_SPHEQUIV: 2
OD_SPHEQUIV: 0.625

## 2019-12-10 ASSESSMENT — VISUAL ACUITY
OS_BCVA: 20/40
OD_BCVA: 20/25

## 2019-12-12 ENCOUNTER — AMBUL SURGICAL CARE (OUTPATIENT)
Dept: URBAN - NONMETROPOLITAN AREA SURGERY 1 | Facility: SURGERY | Age: 74
Setting detail: OPHTHALMOLOGY
End: 2019-12-12
Payer: COMMERCIAL

## 2019-12-12 DIAGNOSIS — H25.12: ICD-10-CM

## 2019-12-12 DIAGNOSIS — H25.012: ICD-10-CM

## 2019-12-12 PROCEDURE — 66984 XCAPSL CTRC RMVL W/O ECP: CPT | Performed by: OPHTHALMOLOGY

## 2019-12-12 PROCEDURE — G8907 PT DOC NO EVENTS ON DISCHARG: HCPCS | Performed by: OPHTHALMOLOGY

## 2019-12-12 PROCEDURE — 66984 XCAPSL CTRC RMVL W/O ECP: CPT | Performed by: CLINIC/CENTER

## 2019-12-12 PROCEDURE — G8907 PT DOC NO EVENTS ON DISCHARG: HCPCS | Performed by: CLINIC/CENTER

## 2019-12-12 PROCEDURE — G8918 PT W/O PREOP ORDER IV AB PRO: HCPCS | Performed by: CLINIC/CENTER

## 2019-12-12 PROCEDURE — G8918 PT W/O PREOP ORDER IV AB PRO: HCPCS | Performed by: OPHTHALMOLOGY

## 2019-12-13 ENCOUNTER — DOCTOR'S OFFICE (OUTPATIENT)
Dept: URBAN - NONMETROPOLITAN AREA CLINIC 1 | Facility: CLINIC | Age: 74
Setting detail: OPHTHALMOLOGY
End: 2019-12-13
Payer: COMMERCIAL

## 2019-12-13 DIAGNOSIS — Z96.1: ICD-10-CM

## 2019-12-13 PROBLEM — H25.12 CATARACT NUCLEAR SCLEROSIS AGE RELATED; LEFT EYE: Status: RESOLVED | Noted: 2019-12-06 | Resolved: 2019-12-13

## 2019-12-13 PROBLEM — H25.012 CORTICAL CATARACT; LEFT EYE: Status: RESOLVED | Noted: 2019-12-06 | Resolved: 2019-12-13

## 2019-12-13 PROCEDURE — 99024 POSTOP FOLLOW-UP VISIT: CPT | Performed by: OPHTHALMOLOGY

## 2019-12-13 ASSESSMENT — DRY EYES - PHYSICIAN NOTES: OS_GENERALCOMMENTS: KSICCA

## 2019-12-13 ASSESSMENT — REFRACTION_AUTOREFRACTION
OS_CYLINDER: -2.00
OS_SPHERE: +1.50
OD_CYLINDER: -2.50
OD_SPHERE: +1.50
OS_AXIS: 84
OD_AXIS: 84

## 2019-12-13 ASSESSMENT — REFRACTION_MANIFEST
OD_VA3: 20/
OU_VA: 20/
OU_VA: 20/
OS_VA1: 20/
OS_VA1: 20/
OS_VA3: 20/
OS_VA2: 20/
OD_VA2: 20/
OS_VA3: 20/
OD_VA1: 20/
OD_VA2: 20/
OS_VA2: 20/
OD_VA3: 20/
OD_VA1: 20/

## 2019-12-13 ASSESSMENT — REFRACTION_CURRENTRX
OD_OVR_VA: 20/
OS_SPHERE: +3.00
OD_OVR_VA: 20/
OS_AXIS: 088
OD_AXIS: 106
OS_VPRISM_DIRECTION: PROGS
OS_OVR_VA: 20/
OD_SPHERE: +3.25
OD_CYLINDER: -2.50
OS_CYLINDER: -2.50
OD_ADD: +2.75
OS_ADD: +2.75
OS_OVR_VA: 20/
OD_VPRISM_DIRECTION: PROGS
OS_OVR_VA: 20/
OD_OVR_VA: 20/

## 2019-12-13 ASSESSMENT — VISUAL ACUITY
OS_BCVA: 20/40
OD_BCVA: 20/30-1

## 2019-12-13 ASSESSMENT — SPHEQUIV_DERIVED
OD_SPHEQUIV: 0.25
OS_SPHEQUIV: 0.5

## 2019-12-13 ASSESSMENT — LID POSITION - DERMATOCHALASIS
OD_DERMATOCHALASIS: RUL 1+
OS_DERMATOCHALASIS: LUL 1+

## 2019-12-13 ASSESSMENT — CONFRONTATIONAL VISUAL FIELD TEST (CVF)
OS_FINDINGS: FULL
OD_FINDINGS: FULL

## 2019-12-13 ASSESSMENT — LID EXAM ASSESSMENTS
OD_BLEPHARITIS: ABSENT
OS_BLEPHARITIS: ABSENT

## 2019-12-20 ENCOUNTER — DOCTOR'S OFFICE (OUTPATIENT)
Dept: URBAN - NONMETROPOLITAN AREA CLINIC 1 | Facility: CLINIC | Age: 74
Setting detail: OPHTHALMOLOGY
End: 2019-12-20
Payer: COMMERCIAL

## 2019-12-20 DIAGNOSIS — Z96.1: ICD-10-CM

## 2019-12-20 PROCEDURE — 92020 GONIOSCOPY: CPT | Performed by: OPHTHALMOLOGY

## 2019-12-20 PROCEDURE — 99024 POSTOP FOLLOW-UP VISIT: CPT | Performed by: OPHTHALMOLOGY

## 2019-12-20 ASSESSMENT — REFRACTION_CURRENTRX
OD_VPRISM_DIRECTION: PROGS
OD_AXIS: 106
OD_SPHERE: +3.25
OS_SPHERE: +3.00
OS_ADD: +2.75
OS_AXIS: 088
OD_ADD: +2.75
OD_OVR_VA: 20/
OD_OVR_VA: 20/
OS_OVR_VA: 20/
OS_VPRISM_DIRECTION: PROGS
OS_OVR_VA: 20/
OD_OVR_VA: 20/
OD_CYLINDER: -2.50
OS_CYLINDER: -2.50
OS_OVR_VA: 20/

## 2019-12-20 ASSESSMENT — KERATOMETRY
OS_K2POWER_DIOPTERS: 44.50
OD_AXISANGLE_DEGREES: 091
OS_K1POWER_DIOPTERS: 42.75
OD_K1POWER_DIOPTERS: 43.50
OD_K2POWER_DIOPTERS: 44.75
OS_AXISANGLE_DEGREES: 085

## 2019-12-20 ASSESSMENT — LID EXAM ASSESSMENTS
OS_BLEPHARITIS: ABSENT
OD_BLEPHARITIS: ABSENT

## 2019-12-20 ASSESSMENT — VISUAL ACUITY
OD_BCVA: 20/50+2
OS_BCVA: 20/70+1

## 2019-12-20 ASSESSMENT — SPHEQUIV_DERIVED
OD_SPHEQUIV: 1.25
OS_SPHEQUIV: 1.5

## 2019-12-20 ASSESSMENT — REFRACTION_MANIFEST
OD_VA1: 20/
OD_VA2: 20/
OS_VA1: 20/
OD_VA1: 20/
OS_VA2: 20/
OD_VA3: 20/
OU_VA: 20/
OD_VA3: 20/
OU_VA: 20/
OS_VA3: 20/
OD_VA2: 20/
OS_VA2: 20/
OS_VA1: 20/
OS_VA3: 20/

## 2019-12-20 ASSESSMENT — REFRACTION_AUTOREFRACTION
OS_SPHERE: +3.00
OD_SPHERE: +3.00
OS_AXIS: 068
OD_AXIS: 092
OS_CYLINDER: -3.00
OD_CYLINDER: -3.50

## 2019-12-20 ASSESSMENT — LID POSITION - DERMATOCHALASIS
OS_DERMATOCHALASIS: LUL 1+
OD_DERMATOCHALASIS: RUL 1+

## 2019-12-20 ASSESSMENT — AXIALLENGTH_DERIVED
OD_AL: 22.8965
OS_AL: 22.978

## 2020-01-21 ENCOUNTER — DOCTOR'S OFFICE (OUTPATIENT)
Dept: URBAN - NONMETROPOLITAN AREA CLINIC 1 | Facility: CLINIC | Age: 75
Setting detail: OPHTHALMOLOGY
End: 2020-01-21

## 2020-01-21 VITALS — HEIGHT: 55 IN

## 2020-01-21 DIAGNOSIS — Z96.1: ICD-10-CM

## 2020-01-21 PROCEDURE — 99024 POSTOP FOLLOW-UP VISIT: CPT | Performed by: OPHTHALMOLOGY

## 2020-01-21 ASSESSMENT — KERATOMETRY
OD_K2POWER_DIOPTERS: 44.75
OD_AXISANGLE_DEGREES: 091
OD_K1POWER_DIOPTERS: 43.50
OS_K1POWER_DIOPTERS: 42.75
OS_K2POWER_DIOPTERS: 44.50
OS_AXISANGLE_DEGREES: 085

## 2020-01-21 ASSESSMENT — REFRACTION_CURRENTRX
OS_ADD: +2.75
OD_VPRISM_DIRECTION: PROGS
OS_AXIS: 088
OD_CYLINDER: -2.50
OD_ADD: +2.75
OS_SPHERE: +3.00
OD_AXIS: 106
OD_OVR_VA: 20/
OS_OVR_VA: 20/
OS_VPRISM_DIRECTION: PROGS
OS_CYLINDER: -2.50
OD_SPHERE: +3.25

## 2020-01-21 ASSESSMENT — REFRACTION_AUTOREFRACTION
OD_CYLINDER: -1.50
OD_SPHERE: +0.25
OS_AXIS: 109
OD_AXIS: 075
OS_SPHERE: +2.75
OS_CYLINDER: -3.00

## 2020-01-21 ASSESSMENT — AXIALLENGTH_DERIVED
OD_AL: 23.5572
OS_AL: 23.0708

## 2020-01-21 ASSESSMENT — LID EXAM ASSESSMENTS
OS_BLEPHARITIS: ABSENT
OD_BLEPHARITIS: ABSENT

## 2020-01-21 ASSESSMENT — LID POSITION - DERMATOCHALASIS
OD_DERMATOCHALASIS: RUL 1+
OS_DERMATOCHALASIS: LUL 1+

## 2020-01-21 ASSESSMENT — VISUAL ACUITY
OD_BCVA: 20/40-2
OS_BCVA: 20/60-1

## 2020-01-21 ASSESSMENT — SPHEQUIV_DERIVED
OD_SPHEQUIV: -0.5
OS_SPHEQUIV: 1.25

## 2020-07-17 ENCOUNTER — DOCTOR'S OFFICE (OUTPATIENT)
Dept: URBAN - NONMETROPOLITAN AREA CLINIC 1 | Facility: CLINIC | Age: 75
Setting detail: OPHTHALMOLOGY
End: 2020-07-17
Payer: COMMERCIAL

## 2020-07-17 VITALS — HEIGHT: 55 IN

## 2020-07-17 DIAGNOSIS — Z96.1: ICD-10-CM

## 2020-07-17 DIAGNOSIS — H43.811: ICD-10-CM

## 2020-07-17 DIAGNOSIS — H43.812: ICD-10-CM

## 2020-07-17 DIAGNOSIS — E11.3293: ICD-10-CM

## 2020-07-17 DIAGNOSIS — Z79.84: ICD-10-CM

## 2020-07-17 DIAGNOSIS — H35.3131: ICD-10-CM

## 2020-07-17 DIAGNOSIS — H04.121: ICD-10-CM

## 2020-07-17 DIAGNOSIS — H04.122: ICD-10-CM

## 2020-07-17 PROCEDURE — 92134 CPTRZ OPH DX IMG PST SGM RTA: CPT | Performed by: OPHTHALMOLOGY

## 2020-07-17 PROCEDURE — 92014 COMPRE OPH EXAM EST PT 1/>: CPT | Performed by: OPHTHALMOLOGY

## 2020-07-17 ASSESSMENT — REFRACTION_CURRENTRX
OD_ADD: +2.50
OD_CYLINDER: -2.25
OS_VPRISM_DIRECTION: PROGS
OS_OVR_VA: 20/
OD_OVR_VA: 20/
OS_CYLINDER: -1.75
OS_ADD: +2.50
OS_SPHERE: +1.75
OS_AXIS: 096
OD_AXIS: 095
OD_SPHERE: +2.00
OD_VPRISM_DIRECTION: PROGS

## 2020-07-17 ASSESSMENT — REFRACTION_AUTOREFRACTION
OD_CYLINDER: -2.50
OD_SPHERE: +2.00
OS_CYLINDER: -3.00
OD_AXIS: 100
OS_AXIS: 096
OS_SPHERE: +2.50

## 2020-07-17 ASSESSMENT — LID POSITION - DERMATOCHALASIS
OD_DERMATOCHALASIS: RUL 1+
OS_DERMATOCHALASIS: LUL 1+

## 2020-07-17 ASSESSMENT — VISUAL ACUITY
OD_BCVA: 20/30-2
OS_BCVA: 20/30-1

## 2020-07-17 ASSESSMENT — AXIALLENGTH_DERIVED
OD_AL: 22.9503
OS_AL: 23.1643

## 2020-07-17 ASSESSMENT — DRY EYES - PHYSICIAN NOTES
OD_GENERALCOMMENTS: TRACE PEE
OS_GENERALCOMMENTS: +1 PEE

## 2020-07-17 ASSESSMENT — KERATOMETRY
OD_K1POWER_DIOPTERS: 43.75
OS_K1POWER_DIOPTERS: 42.75
OD_AXISANGLE_DEGREES: 089
OS_AXISANGLE_DEGREES: 012
OD_K2POWER_DIOPTERS: 45.25
OS_K2POWER_DIOPTERS: 44.50

## 2020-07-17 ASSESSMENT — SPHEQUIV_DERIVED
OD_SPHEQUIV: 0.75
OS_SPHEQUIV: 1

## 2020-07-17 ASSESSMENT — CONFRONTATIONAL VISUAL FIELD TEST (CVF)
OS_FINDINGS: FULL
OD_FINDINGS: FULL

## 2020-07-17 ASSESSMENT — LID EXAM ASSESSMENTS
OS_BLEPHARITIS: ABSENT
OD_BLEPHARITIS: ABSENT

## 2021-01-15 ENCOUNTER — IMMUNIZATIONS (OUTPATIENT)
Dept: FAMILY MEDICINE CLINIC | Facility: HOSPITAL | Age: 76
End: 2021-01-15

## 2021-01-15 DIAGNOSIS — Z23 ENCOUNTER FOR IMMUNIZATION: Primary | ICD-10-CM

## 2021-01-15 PROCEDURE — 0011A SARS-COV-2 / COVID-19 MRNA VACCINE (MODERNA) 100 MCG: CPT

## 2021-01-15 PROCEDURE — 91301 SARS-COV-2 / COVID-19 MRNA VACCINE (MODERNA) 100 MCG: CPT

## 2021-02-11 ENCOUNTER — IMMUNIZATIONS (OUTPATIENT)
Dept: FAMILY MEDICINE CLINIC | Facility: HOSPITAL | Age: 76
End: 2021-02-11

## 2021-02-11 DIAGNOSIS — Z23 ENCOUNTER FOR IMMUNIZATION: Primary | ICD-10-CM

## 2021-02-11 PROCEDURE — 91301 SARS-COV-2 / COVID-19 MRNA VACCINE (MODERNA) 100 MCG: CPT

## 2021-02-11 PROCEDURE — 0012A SARS-COV-2 / COVID-19 MRNA VACCINE (MODERNA) 100 MCG: CPT

## 2021-11-23 ENCOUNTER — IMMUNIZATIONS (OUTPATIENT)
Dept: FAMILY MEDICINE CLINIC | Facility: HOSPITAL | Age: 76
End: 2021-11-23

## 2021-11-23 DIAGNOSIS — Z23 ENCOUNTER FOR IMMUNIZATION: Primary | ICD-10-CM

## 2021-11-23 PROCEDURE — 91306 COVID-19 MODERNA VACC 0.25 ML BOOSTER: CPT

## 2021-11-23 PROCEDURE — 0064A COVID-19 MODERNA VACC 0.25 ML BOOSTER: CPT

## 2022-10-04 ENCOUNTER — DOCTOR'S OFFICE (OUTPATIENT)
Dept: URBAN - NONMETROPOLITAN AREA CLINIC 1 | Facility: CLINIC | Age: 77
Setting detail: OPHTHALMOLOGY
End: 2022-10-04
Payer: COMMERCIAL

## 2022-10-04 DIAGNOSIS — H26.40: ICD-10-CM

## 2022-10-04 DIAGNOSIS — H04.121: ICD-10-CM

## 2022-10-04 DIAGNOSIS — Z79.84: ICD-10-CM

## 2022-10-04 DIAGNOSIS — H43.811: ICD-10-CM

## 2022-10-04 DIAGNOSIS — Z96.1: ICD-10-CM

## 2022-10-04 DIAGNOSIS — H43.812: ICD-10-CM

## 2022-10-04 DIAGNOSIS — E11.3292: ICD-10-CM

## 2022-10-04 DIAGNOSIS — H04.122: ICD-10-CM

## 2022-10-04 DIAGNOSIS — H35.3131: ICD-10-CM

## 2022-10-04 PROBLEM — I63.9: Status: ACTIVE | Noted: 2018-01-18

## 2022-10-04 PROBLEM — D31.3 NEVUS: Status: ACTIVE | Noted: 2017-01-30

## 2022-10-04 PROBLEM — H43.81 POSTERIOR VITREOUS DETACHMENT: Status: ACTIVE | Noted: 2017-01-30

## 2022-10-04 PROBLEM — H02.834 DERMATOCHALASIS; RIGHT UPPER LID, LEFT UPPER LID: Status: ACTIVE | Noted: 2017-01-30

## 2022-10-04 PROBLEM — H43.813 POSTERIOR VITREOUS DETACHMENT; BOTH EYES: Status: ACTIVE | Noted: 2018-08-23

## 2022-10-04 PROBLEM — H02.831 DERMATOCHALASIS; RIGHT UPPER LID, LEFT UPPER LID: Status: ACTIVE | Noted: 2017-01-30

## 2022-10-04 PROCEDURE — 99214 OFFICE O/P EST MOD 30 MIN: CPT | Performed by: OPHTHALMOLOGY

## 2022-10-04 PROCEDURE — 92134 CPTRZ OPH DX IMG PST SGM RTA: CPT | Performed by: OPHTHALMOLOGY

## 2022-10-04 ASSESSMENT — REFRACTION_CURRENTRX
OS_ADD: +2.50
OS_VPRISM_DIRECTION: PROGS
OS_CYLINDER: -1.75
OS_OVR_VA: 20/
OS_AXIS: 096
OD_ADD: +2.50
OS_SPHERE: +1.75
OD_OVR_VA: 20/
OD_CYLINDER: -2.25
OD_AXIS: 095
OD_SPHERE: +2.00
OD_VPRISM_DIRECTION: PROGS

## 2022-10-04 ASSESSMENT — KERATOMETRY
OD_K1POWER_DIOPTERS: 43.50
OS_K1POWER_DIOPTERS: 43.00
OS_K2POWER_DIOPTERS: 44.50
OD_AXISANGLE_DEGREES: 090
OD_K2POWER_DIOPTERS: 45.25
OS_AXISANGLE_DEGREES: 088

## 2022-10-04 ASSESSMENT — AXIALLENGTH_DERIVED
OD_AL: 22.9017
OS_AL: 23.167

## 2022-10-04 ASSESSMENT — REFRACTION_AUTOREFRACTION
OD_CYLINDER: -2.00
OD_SPHERE: +2.00
OS_SPHERE: +2.25
OS_AXIS: 093
OS_CYLINDER: -2.75
OD_AXIS: 092

## 2022-10-04 ASSESSMENT — LID POSITION - DERMATOCHALASIS
OS_DERMATOCHALASIS: LUL 1+
OD_DERMATOCHALASIS: RUL 1+

## 2022-10-04 ASSESSMENT — VISUAL ACUITY
OS_BCVA: 20/40
OD_BCVA: 20/60-2

## 2022-10-04 ASSESSMENT — LID EXAM ASSESSMENTS
OD_BLEPHARITIS: ABSENT
OS_BLEPHARITIS: ABSENT

## 2022-10-04 ASSESSMENT — CONFRONTATIONAL VISUAL FIELD TEST (CVF)
OS_FINDINGS: FULL
OD_FINDINGS: FULL

## 2022-10-04 ASSESSMENT — SPHEQUIV_DERIVED
OS_SPHEQUIV: 0.875
OD_SPHEQUIV: 1

## 2022-10-24 ENCOUNTER — AMBUL SURGICAL CARE (OUTPATIENT)
Dept: URBAN - NONMETROPOLITAN AREA SURGERY 1 | Facility: SURGERY | Age: 77
Setting detail: OPHTHALMOLOGY
End: 2022-10-24
Payer: COMMERCIAL

## 2022-10-24 DIAGNOSIS — H26.491: ICD-10-CM

## 2022-10-24 PROCEDURE — G8918 PT W/O PREOP ORDER IV AB PRO: HCPCS | Performed by: CLINIC/CENTER

## 2022-10-24 PROCEDURE — G8918 PT W/O PREOP ORDER IV AB PRO: HCPCS | Performed by: OPHTHALMOLOGY

## 2022-10-24 PROCEDURE — 66821 AFTER CATARACT LASER SURGERY: CPT | Performed by: CLINIC/CENTER

## 2022-10-24 PROCEDURE — G8907 PT DOC NO EVENTS ON DISCHARG: HCPCS | Performed by: CLINIC/CENTER

## 2022-10-24 PROCEDURE — 66821 AFTER CATARACT LASER SURGERY: CPT | Performed by: OPHTHALMOLOGY

## 2022-10-24 PROCEDURE — G8907 PT DOC NO EVENTS ON DISCHARG: HCPCS | Performed by: OPHTHALMOLOGY

## 2022-11-08 ENCOUNTER — AMBUL SURGICAL CARE (OUTPATIENT)
Dept: URBAN - NONMETROPOLITAN AREA SURGERY 1 | Facility: SURGERY | Age: 77
Setting detail: OPHTHALMOLOGY
End: 2022-11-08
Payer: COMMERCIAL

## 2022-11-08 DIAGNOSIS — H26.492: ICD-10-CM

## 2022-11-08 PROCEDURE — G8918 PT W/O PREOP ORDER IV AB PRO: HCPCS | Performed by: CLINIC/CENTER

## 2022-11-08 PROCEDURE — G8918 PT W/O PREOP ORDER IV AB PRO: HCPCS | Performed by: OPHTHALMOLOGY

## 2022-11-08 PROCEDURE — 66821 AFTER CATARACT LASER SURGERY: CPT | Performed by: OPHTHALMOLOGY

## 2022-11-08 PROCEDURE — G8907 PT DOC NO EVENTS ON DISCHARG: HCPCS | Performed by: OPHTHALMOLOGY

## 2022-11-08 PROCEDURE — G8907 PT DOC NO EVENTS ON DISCHARG: HCPCS | Performed by: CLINIC/CENTER

## 2022-11-08 PROCEDURE — 66821 AFTER CATARACT LASER SURGERY: CPT | Performed by: CLINIC/CENTER

## 2022-12-14 ENCOUNTER — DOCTOR'S OFFICE (OUTPATIENT)
Dept: URBAN - NONMETROPOLITAN AREA CLINIC 1 | Facility: CLINIC | Age: 77
Setting detail: OPHTHALMOLOGY
End: 2022-12-14
Payer: COMMERCIAL

## 2022-12-14 ENCOUNTER — RX ONLY (RX ONLY)
Age: 77
End: 2022-12-14

## 2022-12-14 DIAGNOSIS — H43.811: ICD-10-CM

## 2022-12-14 DIAGNOSIS — H35.3131: ICD-10-CM

## 2022-12-14 DIAGNOSIS — H04.121: ICD-10-CM

## 2022-12-14 DIAGNOSIS — Z96.1: ICD-10-CM

## 2022-12-14 DIAGNOSIS — H02.834: ICD-10-CM

## 2022-12-14 DIAGNOSIS — E11.3293: ICD-10-CM

## 2022-12-14 DIAGNOSIS — Z79.84: ICD-10-CM

## 2022-12-14 DIAGNOSIS — H04.122: ICD-10-CM

## 2022-12-14 DIAGNOSIS — H43.812: ICD-10-CM

## 2022-12-14 DIAGNOSIS — I63.9: ICD-10-CM

## 2022-12-14 DIAGNOSIS — H02.831: ICD-10-CM

## 2022-12-14 PROCEDURE — 99024 POSTOP FOLLOW-UP VISIT: CPT | Performed by: OPHTHALMOLOGY

## 2022-12-14 ASSESSMENT — REFRACTION_AUTOREFRACTION
OD_CYLINDER: -3.00
OS_AXIS: 088
OS_SPHERE: +2.50
OD_AXIS: 092
OS_CYLINDER: -2.75
OD_SPHERE: +2.50

## 2022-12-14 ASSESSMENT — REFRACTION_CURRENTRX
OS_VPRISM_DIRECTION: PROGS
OS_ADD: +2.50
OS_SPHERE: +1.75
OD_CYLINDER: -2.25
OD_VPRISM_DIRECTION: PROGS
OD_AXIS: 095
OS_AXIS: 096
OS_CYLINDER: -1.75
OS_OVR_VA: 20/
OD_ADD: +2.50
OD_OVR_VA: 20/
OD_SPHERE: +2.00

## 2022-12-14 ASSESSMENT — AXIALLENGTH_DERIVED
OD_AL: 22.9017
OS_AL: 23.0734

## 2022-12-14 ASSESSMENT — SPHEQUIV_DERIVED
OD_SPHEQUIV: 1
OS_SPHEQUIV: 1.125

## 2022-12-14 ASSESSMENT — LID POSITION - DERMATOCHALASIS
OS_DERMATOCHALASIS: LUL 1+
OD_DERMATOCHALASIS: RUL 1+

## 2022-12-14 ASSESSMENT — KERATOMETRY
OD_AXISANGLE_DEGREES: 090
OS_K2POWER_DIOPTERS: 44.50
OS_AXISANGLE_DEGREES: 088
OD_K1POWER_DIOPTERS: 43.50
OD_K2POWER_DIOPTERS: 45.25
OS_K1POWER_DIOPTERS: 43.00

## 2022-12-14 ASSESSMENT — LID EXAM ASSESSMENTS
OD_BLEPHARITIS: ABSENT
OS_BLEPHARITIS: ABSENT

## 2022-12-14 ASSESSMENT — CONFRONTATIONAL VISUAL FIELD TEST (CVF)
OS_FINDINGS: FULL
OD_FINDINGS: FULL

## 2022-12-14 ASSESSMENT — VISUAL ACUITY
OD_BCVA: 20/30-2
OS_BCVA: 20/40+1

## 2023-06-14 ENCOUNTER — DOCTOR'S OFFICE (OUTPATIENT)
Dept: URBAN - NONMETROPOLITAN AREA CLINIC 1 | Facility: CLINIC | Age: 78
Setting detail: OPHTHALMOLOGY
End: 2023-06-14
Payer: COMMERCIAL

## 2023-06-14 DIAGNOSIS — Z79.84: ICD-10-CM

## 2023-06-14 DIAGNOSIS — E11.9: ICD-10-CM

## 2023-06-14 DIAGNOSIS — H02.831: ICD-10-CM

## 2023-06-14 DIAGNOSIS — I63.9: ICD-10-CM

## 2023-06-14 DIAGNOSIS — H04.121: ICD-10-CM

## 2023-06-14 DIAGNOSIS — H02.834: ICD-10-CM

## 2023-06-14 DIAGNOSIS — Z96.1: ICD-10-CM

## 2023-06-14 DIAGNOSIS — H43.813: ICD-10-CM

## 2023-06-14 DIAGNOSIS — H35.3131: ICD-10-CM

## 2023-06-14 DIAGNOSIS — H04.122: ICD-10-CM

## 2023-06-14 PROCEDURE — 92014 COMPRE OPH EXAM EST PT 1/>: CPT | Performed by: OPHTHALMOLOGY

## 2023-06-14 PROCEDURE — 92134 CPTRZ OPH DX IMG PST SGM RTA: CPT | Performed by: OPHTHALMOLOGY

## 2023-06-14 ASSESSMENT — REFRACTION_AUTOREFRACTION
OS_SPHERE: +2.00
OD_CYLINDER: -2.00
OD_SPHERE: +1.25
OD_AXIS: 084
OS_CYLINDER: -2.25
OS_AXIS: 090

## 2023-06-14 ASSESSMENT — REFRACTION_CURRENTRX
OD_AXIS: 095
OS_AXIS: 096
OS_OVR_VA: 20/
OD_ADD: +2.50
OD_SPHERE: +2.00
OS_VPRISM_DIRECTION: PROGS
OD_OVR_VA: 20/
OS_ADD: +2.50
OS_SPHERE: +1.75
OS_CYLINDER: -1.75
OD_VPRISM_DIRECTION: PROGS
OD_CYLINDER: -2.25

## 2023-06-14 ASSESSMENT — KERATOMETRY
OS_AXISANGLE_DEGREES: 005
OD_AXISANGLE_DEGREES: 170
OD_K1POWER_DIOPTERS: 45.00
OS_K1POWER_DIOPTERS: 43.00
OD_K2POWER_DIOPTERS: 46.00
OS_K2POWER_DIOPTERS: 44.25

## 2023-06-14 ASSESSMENT — LID EXAM ASSESSMENTS
OS_BLEPHARITIS: ABSENT
OD_BLEPHARITIS: ABSENT

## 2023-06-14 ASSESSMENT — VISUAL ACUITY
OS_BCVA: 20/20-2
OD_BCVA: 20/25-2

## 2023-06-14 ASSESSMENT — LID POSITION - DERMATOCHALASIS
OD_DERMATOCHALASIS: RUL 1+
OS_DERMATOCHALASIS: LUL 1+

## 2023-06-14 ASSESSMENT — SPHEQUIV_DERIVED
OS_SPHEQUIV: 0.875
OD_SPHEQUIV: 0.25

## 2023-06-14 ASSESSMENT — CONFRONTATIONAL VISUAL FIELD TEST (CVF)
OD_FINDINGS: FULL
OS_FINDINGS: FULL

## 2023-06-14 ASSESSMENT — AXIALLENGTH_DERIVED
OD_AL: 22.7882
OS_AL: 23.2113

## 2023-09-02 ENCOUNTER — APPOINTMENT (EMERGENCY)
Dept: CT IMAGING | Facility: HOSPITAL | Age: 78
End: 2023-09-02
Payer: MEDICARE

## 2023-09-02 ENCOUNTER — OFFICE VISIT (OUTPATIENT)
Dept: URGENT CARE | Facility: CLINIC | Age: 78
End: 2023-09-02
Payer: MEDICARE

## 2023-09-02 ENCOUNTER — TELEPHONE (OUTPATIENT)
Dept: OTHER | Facility: HOSPITAL | Age: 78
End: 2023-09-02

## 2023-09-02 ENCOUNTER — HOSPITAL ENCOUNTER (EMERGENCY)
Facility: HOSPITAL | Age: 78
Discharge: HOME | End: 2023-09-02
Attending: EMERGENCY MEDICINE
Payer: MEDICARE

## 2023-09-02 VITALS
TEMPERATURE: 97.6 F | HEIGHT: 62 IN | BODY MASS INDEX: 25.6 KG/M2 | SYSTOLIC BLOOD PRESSURE: 165 MMHG | DIASTOLIC BLOOD PRESSURE: 74 MMHG | HEART RATE: 81 BPM | OXYGEN SATURATION: 94 % | RESPIRATION RATE: 16 BRPM | WEIGHT: 139.11 LBS

## 2023-09-02 VITALS
DIASTOLIC BLOOD PRESSURE: 68 MMHG | TEMPERATURE: 97.3 F | HEART RATE: 69 BPM | RESPIRATION RATE: 18 BRPM | OXYGEN SATURATION: 97 % | SYSTOLIC BLOOD PRESSURE: 116 MMHG

## 2023-09-02 DIAGNOSIS — R10.30 LOWER ABDOMINAL PAIN: Primary | ICD-10-CM

## 2023-09-02 DIAGNOSIS — N20.1 LEFT URETERAL STONE: Primary | ICD-10-CM

## 2023-09-02 LAB
ALBUMIN SERPL BCP-MCNC: 4.4 G/DL (ref 3.5–5)
ALP SERPL-CCNC: 58 U/L (ref 34–104)
ALT SERPL W P-5'-P-CCNC: 25 U/L (ref 7–52)
ANION GAP SERPL CALCULATED.3IONS-SCNC: 9 MMOL/L
AST SERPL W P-5'-P-CCNC: 22 U/L (ref 13–39)
BACTERIA UR QL AUTO: ABNORMAL /HPF
BASOPHILS # BLD AUTO: 0.04 THOUSANDS/ÂΜL (ref 0–0.1)
BASOPHILS NFR BLD AUTO: 0 % (ref 0–1)
BILIRUB SERPL-MCNC: 0.92 MG/DL (ref 0.2–1)
BILIRUB UR QL STRIP: NEGATIVE
BUN SERPL-MCNC: 25 MG/DL (ref 5–25)
CALCIUM SERPL-MCNC: 9.3 MG/DL (ref 8.4–10.2)
CHLORIDE SERPL-SCNC: 103 MMOL/L (ref 96–108)
CLARITY UR: ABNORMAL
CO2 SERPL-SCNC: 27 MMOL/L (ref 21–32)
COLOR UR: ABNORMAL
CREAT SERPL-MCNC: 0.83 MG/DL (ref 0.6–1.3)
EOSINOPHIL # BLD AUTO: 0.1 THOUSAND/ÂΜL (ref 0–0.61)
EOSINOPHIL NFR BLD AUTO: 1 % (ref 0–6)
ERYTHROCYTE [DISTWIDTH] IN BLOOD BY AUTOMATED COUNT: 13.7 % (ref 11.6–15.1)
GFR SERPL CREATININE-BSD FRML MDRD: 66 ML/MIN/1.73SQ M
GLUCOSE SERPL-MCNC: 111 MG/DL (ref 65–140)
GLUCOSE UR STRIP-MCNC: NEGATIVE MG/DL
HCT VFR BLD AUTO: 42.1 % (ref 34.8–46.1)
HGB BLD-MCNC: 13.9 G/DL (ref 11.5–15.4)
HGB UR QL STRIP.AUTO: ABNORMAL
IMM GRANULOCYTES # BLD AUTO: 0.05 THOUSAND/UL (ref 0–0.2)
IMM GRANULOCYTES NFR BLD AUTO: 1 % (ref 0–2)
KETONES UR STRIP-MCNC: NEGATIVE MG/DL
LEUKOCYTE ESTERASE UR QL STRIP: NEGATIVE
LIPASE SERPL-CCNC: 50 U/L (ref 11–82)
LYMPHOCYTES # BLD AUTO: 1.7 THOUSANDS/ÂΜL (ref 0.6–4.47)
LYMPHOCYTES NFR BLD AUTO: 18 % (ref 14–44)
MCH RBC QN AUTO: 31.7 PG (ref 26.8–34.3)
MCHC RBC AUTO-ENTMCNC: 33 G/DL (ref 31.4–37.4)
MCV RBC AUTO: 96 FL (ref 82–98)
MONOCYTES # BLD AUTO: 0.86 THOUSAND/ÂΜL (ref 0.17–1.22)
MONOCYTES NFR BLD AUTO: 9 % (ref 4–12)
NEUTROPHILS # BLD AUTO: 6.97 THOUSANDS/ÂΜL (ref 1.85–7.62)
NEUTS SEG NFR BLD AUTO: 71 % (ref 43–75)
NITRITE UR QL STRIP: NEGATIVE
NON-SQ EPI CELLS URNS QL MICRO: ABNORMAL /HPF
NRBC BLD AUTO-RTO: 0 /100 WBCS
PH UR STRIP.AUTO: 5.5 [PH]
PLATELET # BLD AUTO: 199 THOUSANDS/UL (ref 149–390)
PMV BLD AUTO: 10.2 FL (ref 8.9–12.7)
POTASSIUM SERPL-SCNC: 4.1 MMOL/L (ref 3.5–5.3)
PROT SERPL-MCNC: 7.2 G/DL (ref 6.4–8.4)
PROT UR STRIP-MCNC: ABNORMAL MG/DL
RBC # BLD AUTO: 4.39 MILLION/UL (ref 3.81–5.12)
RBC #/AREA URNS AUTO: ABNORMAL /HPF
SL AMB  POCT GLUCOSE, UA: NEGATIVE
SL AMB LEUKOCYTE ESTERASE,UA: ABNORMAL
SL AMB POCT BILIRUBIN,UA: NEGATIVE
SL AMB POCT BLOOD,UA: ABNORMAL
SL AMB POCT CLARITY,UA: ABNORMAL
SL AMB POCT COLOR,UA: ABNORMAL
SL AMB POCT KETONES,UA: NEGATIVE
SL AMB POCT NITRITE,UA: NEGATIVE
SL AMB POCT PH,UA: 5
SL AMB POCT SPECIFIC GRAVITY,UA: 1.02
SL AMB POCT URINE PROTEIN: ABNORMAL
SL AMB POCT UROBILINOGEN: ABNORMAL
SODIUM SERPL-SCNC: 139 MMOL/L (ref 135–147)
SP GR UR STRIP.AUTO: 1.02 (ref 1–1.03)
UROBILINOGEN UR QL STRIP.AUTO: 0.2 E.U./DL
WBC # BLD AUTO: 9.72 THOUSAND/UL (ref 4.31–10.16)
WBC #/AREA URNS AUTO: ABNORMAL /HPF

## 2023-09-02 PROCEDURE — 80053 COMPREHEN METABOLIC PANEL: CPT | Performed by: EMERGENCY MEDICINE

## 2023-09-02 PROCEDURE — 96375 TX/PRO/DX INJ NEW DRUG ADDON: CPT

## 2023-09-02 PROCEDURE — 74177 CT ABD & PELVIS W/CONTRAST: CPT

## 2023-09-02 PROCEDURE — 96361 HYDRATE IV INFUSION ADD-ON: CPT

## 2023-09-02 PROCEDURE — 96374 THER/PROPH/DIAG INJ IV PUSH: CPT

## 2023-09-02 PROCEDURE — 81001 URINALYSIS AUTO W/SCOPE: CPT | Performed by: EMERGENCY MEDICINE

## 2023-09-02 PROCEDURE — 85025 COMPLETE CBC W/AUTO DIFF WBC: CPT | Performed by: EMERGENCY MEDICINE

## 2023-09-02 PROCEDURE — 81002 URINALYSIS NONAUTO W/O SCOPE: CPT

## 2023-09-02 PROCEDURE — G0463 HOSPITAL OUTPT CLINIC VISIT: HCPCS

## 2023-09-02 PROCEDURE — 99285 EMERGENCY DEPT VISIT HI MDM: CPT | Performed by: EMERGENCY MEDICINE

## 2023-09-02 PROCEDURE — 36415 COLL VENOUS BLD VENIPUNCTURE: CPT | Performed by: EMERGENCY MEDICINE

## 2023-09-02 PROCEDURE — 87086 URINE CULTURE/COLONY COUNT: CPT

## 2023-09-02 PROCEDURE — 99284 EMERGENCY DEPT VISIT MOD MDM: CPT

## 2023-09-02 PROCEDURE — 83690 ASSAY OF LIPASE: CPT | Performed by: EMERGENCY MEDICINE

## 2023-09-02 PROCEDURE — G1004 CDSM NDSC: HCPCS

## 2023-09-02 PROCEDURE — 99213 OFFICE O/P EST LOW 20 MIN: CPT

## 2023-09-02 RX ORDER — ONDANSETRON 2 MG/ML
4 INJECTION INTRAMUSCULAR; INTRAVENOUS ONCE
Status: COMPLETED | OUTPATIENT
Start: 2023-09-02 | End: 2023-09-02

## 2023-09-02 RX ORDER — OXYCODONE HYDROCHLORIDE AND ACETAMINOPHEN 5; 325 MG/1; MG/1
1 TABLET ORAL EVERY 8 HOURS PRN
Qty: 12 TABLET | Refills: 0 | Status: SHIPPED | OUTPATIENT
Start: 2023-09-02 | End: 2023-09-12

## 2023-09-02 RX ORDER — TAMSULOSIN HYDROCHLORIDE 0.4 MG/1
0.8 CAPSULE ORAL
Qty: 14 CAPSULE | Refills: 0 | Status: SHIPPED | OUTPATIENT
Start: 2023-09-02 | End: 2023-09-09

## 2023-09-02 RX ORDER — KETOROLAC TROMETHAMINE 30 MG/ML
15 INJECTION, SOLUTION INTRAMUSCULAR; INTRAVENOUS ONCE
Status: COMPLETED | OUTPATIENT
Start: 2023-09-02 | End: 2023-09-02

## 2023-09-02 RX ORDER — ONDANSETRON 4 MG/1
4 TABLET, ORALLY DISINTEGRATING ORAL EVERY 6 HOURS PRN
Qty: 20 TABLET | Refills: 0 | Status: SHIPPED | OUTPATIENT
Start: 2023-09-02

## 2023-09-02 RX ADMIN — KETOROLAC TROMETHAMINE 15 MG: 30 INJECTION, SOLUTION INTRAMUSCULAR at 16:39

## 2023-09-02 RX ADMIN — SODIUM CHLORIDE 1000 ML: 0.9 INJECTION, SOLUTION INTRAVENOUS at 16:41

## 2023-09-02 RX ADMIN — ONDANSETRON 4 MG: 2 INJECTION INTRAMUSCULAR; INTRAVENOUS at 16:39

## 2023-09-02 RX ADMIN — IOHEXOL 100 ML: 350 INJECTION, SOLUTION INTRAVENOUS at 17:19

## 2023-09-02 NOTE — DISCHARGE INSTRUCTIONS
The CT scan performed today showed secondary findings of possible pulmonary hypertension. Please discuss this with your primary care physician    The CT scan also revealed a left-sided kidney stone.   Please follow-up with the urology specialists listed below or the urology specialist of your choice

## 2023-09-02 NOTE — ED PROVIDER NOTES
History  Chief Complaint   Patient presents with   • Abdominal Pain     Patient reports abdominal pain/cramping beginning yesterday. States she woke up this morning without pain but it returned after running errands. Reports pain is intermittent, LLQ. Patient complains of abdominal pain and cramping since last night. Had episode of vomiting. No fevers or diarrhea. States pain initially resolved but it has now come back. She complains of a dark color to her urine and is concerned she may have a urinary tract infection      History provided by:  Patient   used: No    Abdominal Pain  Pain location:  LLQ and RLQ  Pain quality: aching and cramping    Pain radiates to:  Does not radiate  Pain severity:  Moderate  Onset quality:  Gradual  Duration:  1 day  Timing:  Constant  Progression:  Unchanged  Chronicity:  New  Relieved by:  Nothing  Worsened by:  Nothing  Ineffective treatments:  None tried  Associated symptoms: nausea and vomiting    Associated symptoms: no chest pain, no chills, no constipation, no cough, no diarrhea, no dysuria, no fatigue, no fever, no hematemesis, no hematochezia, no hematuria, no shortness of breath and no sore throat        Prior to Admission Medications   Prescriptions Last Dose Informant Patient Reported? Taking?    Cholecalciferol 1.25 MG (78157 UT) capsule 9/2/2023  Yes Yes   Sig: Take 1 capsule by mouth daily   amLODIPine-valsartan (EXFORGE) 5-160 MG per tablet 9/2/2023  Yes Yes   Sig: Take 1 tablet by mouth daily   atorvastatin (LIPITOR) 80 mg tablet 9/2/2023  Yes Yes   Sig: Take 1 tablet by mouth every evening   clopidogrel (PLAVIX) 75 mg tablet 9/2/2023  Yes Yes   Sig: Take 75 mg by mouth daily   metFORMIN (GLUCOPHAGE-XR) 500 mg 24 hr tablet 9/2/2023  Yes Yes   Sig: Take 500 mg by mouth 2 (two) times a day   metoprolol tartrate (LOPRESSOR) 50 mg tablet 9/2/2023  Yes Yes   Sig: Take 50 mg by mouth 2 (two) times a day   omega-3-acid ethyl esters (LOVAZA) 1 g capsule 9/2/2023  Yes Yes   Sig: Take 1 g by mouth 2 (two) times a day      Facility-Administered Medications: None       Past Medical History:   Diagnosis Date   • Hyperlipidemia    • Hypertension    • Myocardial infarction [facility])        Past Surgical History:   Procedure Laterality Date   • CORONARY ANGIOPLASTY WITH STENT PLACEMENT     • LAPAROSCOPY     • SHOULDER ARTHROSCOPY W/ ROTATOR CUFF REPAIR Right        Family History   Problem Relation Age of Onset   • No Known Problems Mother    • Cancer Father      I have reviewed and agree with the history as documented. E-Cigarette/Vaping   • E-Cigarette Use Never User      E-Cigarette/Vaping Substances     Social History     Tobacco Use   • Smoking status: Never   • Smokeless tobacco: Never   Vaping Use   • Vaping Use: Never used   Substance Use Topics   • Alcohol use: Yes     Comment: rare   • Drug use: Never       Review of Systems   Constitutional: Negative for chills, fatigue and fever. HENT: Negative for ear pain, hearing loss, sore throat, trouble swallowing and voice change. Eyes: Negative for pain and discharge. Respiratory: Negative for cough, shortness of breath and wheezing. Cardiovascular: Negative for chest pain and palpitations. Gastrointestinal: Positive for abdominal pain, nausea and vomiting. Negative for blood in stool, constipation, diarrhea, hematemesis and hematochezia. Genitourinary: Negative for dysuria, flank pain, frequency and hematuria. Musculoskeletal: Negative for joint swelling, neck pain and neck stiffness. Skin: Negative for rash and wound. Neurological: Negative for dizziness, seizures, syncope, facial asymmetry and headaches. Psychiatric/Behavioral: Negative for hallucinations, self-injury and suicidal ideas. All other systems reviewed and are negative. Physical Exam  Physical Exam  Vitals and nursing note reviewed. Constitutional:       General: She is not in acute distress.      Appearance: She is well-developed. HENT:      Head: Normocephalic and atraumatic. Right Ear: External ear normal.      Left Ear: External ear normal.   Eyes:      General: No scleral icterus. Right eye: No discharge. Left eye: No discharge. Extraocular Movements: Extraocular movements intact. Conjunctiva/sclera: Conjunctivae normal.   Cardiovascular:      Rate and Rhythm: Normal rate and regular rhythm. Heart sounds: Normal heart sounds. No murmur heard. Pulmonary:      Effort: Pulmonary effort is normal.      Breath sounds: Normal breath sounds. No wheezing or rales. Abdominal:      General: Bowel sounds are normal. There is no distension. Palpations: Abdomen is soft. Tenderness: There is no abdominal tenderness. There is no guarding or rebound. Negative signs include Myrick's sign and McBurney's sign. Musculoskeletal:         General: No deformity. Normal range of motion. Cervical back: Normal range of motion and neck supple. Skin:     General: Skin is warm and dry. Findings: No rash. Neurological:      General: No focal deficit present. Mental Status: She is alert and oriented to person, place, and time. Cranial Nerves: No cranial nerve deficit. Psychiatric:         Mood and Affect: Mood normal.         Behavior: Behavior normal.         Thought Content:  Thought content normal.         Judgment: Judgment normal.         Vital Signs  ED Triage Vitals [09/02/23 1613]   Temperature Pulse Respirations Blood Pressure SpO2   97.6 °F (36.4 °C) 69 18 140/67 95 %      Temp Source Heart Rate Source Patient Position - Orthostatic VS BP Location FiO2 (%)   Temporal Monitor Lying Left arm --      Pain Score       1           Vitals:    09/02/23 1745 09/02/23 1800 09/02/23 1815 09/02/23 1830   BP: 122/60 121/64 124/60 124/57   Pulse: 76 73 74 75   Patient Position - Orthostatic VS:             Visual Acuity      ED Medications  Medications   sodium chloride 0.9 % bolus 1,000 mL (0 mL Intravenous Stopped 9/2/23 1753)   ondansetron (ZOFRAN) injection 4 mg (4 mg Intravenous Given 9/2/23 1639)   ketorolac (TORADOL) injection 15 mg (15 mg Intravenous Given 9/2/23 1639)   iohexol (OMNIPAQUE) 350 MG/ML injection (SINGLE-DOSE) 100 mL (100 mL Intravenous Given 9/2/23 1719)       Diagnostic Studies  Results Reviewed     Procedure Component Value Units Date/Time    Comprehensive metabolic panel [[id number]] Collected: 09/02/23 1638    Lab Status: Final result Specimen: Blood from Arm, Left Updated: 09/02/23 1708     Sodium 139 mmol/L      Potassium 4.1 mmol/L      Chloride 103 mmol/L      CO2 27 mmol/L      ANION GAP 9 mmol/L      BUN 25 mg/dL      Creatinine 0.83 mg/dL      Glucose 111 mg/dL      Calcium 9.3 mg/dL      AST 22 U/L      ALT 25 U/L      Alkaline Phosphatase 58 U/L      Total Protein 7.2 g/dL      Albumin 4.4 g/dL      Total Bilirubin 0.92 mg/dL      eGFR 66 ml/min/1.73sq m     Narrative:      [place] guidelines for Chronic Kidney Disease (CKD):   •  Stage 1 with normal or high GFR (GFR > 90 mL/min/1.73 square meters)  •  Stage 2 Mild CKD (GFR = 60-89 mL/min/1.73 square meters)  •  Stage 3A Moderate CKD (GFR = 45-59 mL/min/1.73 square meters)  •  Stage 3B Moderate CKD (GFR = 30-44 mL/min/1.73 square meters)  •  Stage 4 Severe CKD (GFR = 15-29 mL/min/1.73 square meters)  •  Stage 5 End Stage CKD (GFR <15 mL/min/1.73 square meters)  Note: GFR calculation is accurate only with a steady state creatinine    Lipase [[id number]]  (Normal) Collected: 09/02/23 1638    Lab Status: Final result Specimen: Blood from Arm, Left Updated: 09/02/23 1708     Lipase 50 u/L     Urine Microscopic [[id number]]  (Abnormal) Collected: 09/02/23 1638    Lab Status: Final result Specimen: Urine, Clean Catch Updated: 09/02/23 1702     RBC, UA Innumerable /hpf      WBC, UA None Seen /hpf      Epithelial Cells None Seen /hpf      Bacteria, UA None Seen /hpf     CBC and differential [[id number]] Collected: 09/02/23 1638    Lab Status: Final result Specimen: Blood from Arm, Left Updated: 09/02/23 1648     WBC 9.72 Thousand/uL      RBC 4.39 Million/uL      Hemoglobin 13.9 g/dL      Hematocrit 42.1 %      MCV 96 fL      MCH 31.7 pg      MCHC 33.0 g/dL      RDW 13.7 %      MPV 10.2 fL      Platelets 334 Thousands/uL      nRBC 0 /100 WBCs      Neutrophils Relative 71 %      Immat GRANS % 1 %      Lymphocytes Relative 18 %      Monocytes Relative 9 %      Eosinophils Relative 1 %      Basophils Relative 0 %      Neutrophils Absolute 6.97 Thousands/µL      Immature Grans Absolute 0.05 Thousand/uL      Lymphocytes Absolute 1.70 Thousands/µL      Monocytes Absolute 0.86 Thousand/µL      Eosinophils Absolute 0.10 Thousand/µL      Basophils Absolute 0.04 Thousands/µL     UA w Reflex to Microscopic w Reflex to Culture [[id number]]  (Abnormal) Collected: 09/02/23 1638    Lab Status: Final result Specimen: Urine, Clean Catch Updated: 09/02/23 1648     Color, UA Pink     Clarity, UA Slightly Cloudy     Specific Gravity, UA 1.025     pH, UA 5.5     Leukocytes, UA Negative     Nitrite, UA Negative     Protein, UA 30 (1+) mg/dl      Glucose, UA Negative mg/dl      Ketones, UA Negative mg/dl      Urobilinogen, UA 0.2 E.U./dl      Bilirubin, UA Negative     Occult Blood, UA Large                 CT abdomen pelvis w contrast   Final Result by Alejandro Ruiz MD (09/02 1907)      8 mm obstructing calculus in the distal left ureter with moderate left hydronephrosis. Bilateral nephrolithiasis. Enlarged pulmonary arteries suggesting pulmonary arterial hypertension. The study was marked in [facility] for immediate notification. Workstation performed: [id number]                    Procedures  Procedures         ED Course  ED Course as of 09/02/23 1927   Sat Sep 02, 2023   0648 Discussed finding of possible pulmonary hypertension with patient. Advised primary care follow-up and she is agreeable.   Contacted urology for outpatient follow-up regarding left ureteral stone. They will arrange for follow-up. Patient has been provided with outpatient follow-up information for urology. Prescriptions for Flomax, pain medication, antiemetic provided and patient provided with urine strainers. Medical Decision Making  Based on the history and medical screening exam performed the diagnostic considerations include but are not limited to gastroenteritis, colitis, diverticulitis, ureteral stones, pyelonephritis, muscular pain. Based on the work-up performed in the emergency room which includes physical examination, and which may include laboratory studies and imaging as warranted including advanced imaging such as CT scan or ultrasound, the differential diagnosis is narrowed to exclude limb or life-threatening process. The patient is stable for discharge. Patient is nontoxic nonfebrile well-appearing, has normal lab work but CT scan shows 8 mm distal ureteral calculus. Discussed with urology and appropriate for outpatient follow-up given stable condition. Finding of possible pulmonary hypertension discussed with patient and she is advised to follow-up with her primary care physician and is agreeable. Urine strainers provided, appropriate medications prescribed      Amount and/or Complexity of Data Reviewed  External Data Reviewed: labs, radiology and notes. Labs: ordered. Decision-making details documented in ED Course. Details: UA negative, labs normal  Radiology: ordered and independent interpretation performed. Decision-making details documented in ED Course. Details: 8 mm distal left ureteral calculus  Findings suggestive of possible pulmonary hypertension      Risk  Prescription drug management.           Disposition  Final diagnoses:   Left ureteral stone     Time reflects when diagnosis was documented in both MDM as applicable and the Disposition within this note Time User Action Codes Description Comment    9/2/2023  7:16 PM Ambreen Marie Add [N20.1] Left ureteral stone       ED Disposition     ED Disposition   Discharge    Condition   Stable    Date/Time   Sat Sep 2, 2023  7:16 PM    Comment   Cholo Marking discharge to home/self care. Follow-up Information     Follow up With Specialties Details Why Contact Info Additional [facility] Urology Follow up in 1 week(s) Service will contact patient/caregiver with hospital follow-up appointment date and time once discharge 911 [address] [facility] Urology [facility] [address] [place] [address], Entrance A, 2nd Floor, [facility], [facility] [place], [address] [place] [address] St Jake Carlton MD Internal Medicine   [address] [place] [address]  [phone]             Patient's Medications   Discharge Prescriptions    ONDANSETRON (ZOFRAN ODT) 4 MG DISINTEGRATING TABLET    Take 1 tablet (4 mg total) by mouth every 6 (six) hours as needed for nausea or vomiting       Start Date: 9/2/2023  End Date: --       Order Dose: 4 mg       Quantity: 20 tablet    Refills: 0    OXYCODONE-ACETAMINOPHEN (PERCOCET) 5-325 MG PER TABLET    Take 1 tablet by mouth every 8 (eight) hours as needed for moderate pain for up to 10 days Max Daily Amount: 3 tablets       Start Date: 9/2/2023  End Date: 9/12/2023       Order Dose: 1 tablet       Quantity: 12 tablet    Refills: 0    TAMSULOSIN (FLOMAX) 0.4 MG    Take 2 capsules (0.8 mg total) by mouth daily with dinner for 7 days       Start Date: 9/2/2023  End Date: 9/9/2023       Order Dose: 0.8 mg       Quantity: 14 capsule    Refills: 0       No discharge procedures on file.     PDMP Review     None          ED Provider  Electronically Signed by           Ambreen Marie MD  09/02/23 0006

## 2023-09-02 NOTE — PROGRESS NOTES
[facility]        NAME: Maxime Blandon is a 78 y.o. female  : 1945    MRN: [id number]  DATE: 2023  TIME: 3:37 PM    Assessment and Plan   Lower abdominal pain [R10.30]  1. Lower abdominal pain  POCT urine dip    Urine culture    Transfer to other facility        Patient was resting comfortably during examination. She has no tenderness to palpation. Based on her symptoms of vomiting, dark urine, and pain returning today cannot fully rule out kidney stone, diverticulitis/diverticulosis, or other etiologies so advised that she should follow-up at the ER for further evaluation and possible need for blood work and/or imaging. Patient verbalized understanding and stated she would go to [address] [place] [address]. Patient Instructions     Proceed to  ER     Chief Complaint     Chief Complaint   Patient presents with   • Abdominal Pain     C/o lower abdominal cramping/pain radiating to LLQ x2 weeks, vomiting, and dark urine. Last BM was today         History of Present Illness       Abdominal Pain  This is a new problem. Episode onset: 2 weeks but resolved and then came back today. Pain location: lower abdomen. The abdominal pain radiates to the LLQ. Associated symptoms include vomiting (last time was this AM). Pertinent negatives include no constipation, diarrhea, dysuria, fever, frequency, hematuria or nausea. Her last BM was today. She stated the pain went away this morning but then came back this afternoon. She has a hx of a kidney stone 2023. Review of Systems   Review of Systems   Constitutional: Negative for activity change, appetite change, chills, diaphoresis, fatigue, fever and unexpected weight change. HENT: Negative. Respiratory: Negative. Cardiovascular: Negative. Gastrointestinal: Positive for abdominal pain (lower abdomen pain and cramping) and vomiting (last time was this AM). Negative for blood in stool, constipation, diarrhea and nausea.    Genitourinary: Negative for dysuria, flank pain, frequency, hematuria and urgency. Patient reports dark urine that started this morning   Skin: Negative. Neurological: Negative. Current Medications       Current Outpatient Medications:   •  amLODIPine-valsartan (EXFORGE) 5-160 MG per tablet, Take 1 tablet by mouth daily, Disp: , Rfl:   •  atorvastatin (LIPITOR) 80 mg tablet, Take 1 tablet by mouth every evening, Disp: , Rfl:   •  Cholecalciferol 1.25 MG (58552 UT) capsule, Take 1 capsule by mouth daily, Disp: , Rfl:   •  clopidogrel (PLAVIX) 75 mg tablet, Take 75 mg by mouth daily, Disp: , Rfl:   •  metFORMIN (GLUCOPHAGE-XR) 500 mg 24 hr tablet, Take 500 mg by mouth 2 (two) times a day, Disp: , Rfl:   •  metoprolol tartrate (LOPRESSOR) 50 mg tablet, Take 50 mg by mouth 2 (two) times a day, Disp: , Rfl:   •  omega-3-acid ethyl esters (LOVAZA) 1 g capsule, Take 1 g by mouth 2 (two) times a day, Disp: , Rfl:     Current Allergies     Allergies as of 09/02/2023 - Reviewed 09/02/2023   Allergen Reaction Noted   • Sulfa antibiotics GI Intolerance 09/05/2007            The following portions of the patient's history were reviewed and updated as appropriate: allergies, current medications, past family history, past medical history, past social history, past surgical history and problem list.     Past Medical History:   Diagnosis Date   • Hyperlipidemia    • Hypertension    • Myocardial infarction [facility])        Past Surgical History:   Procedure Laterality Date   • CORONARY ANGIOPLASTY WITH STENT PLACEMENT     • LAPAROSCOPY     • SHOULDER ARTHROSCOPY W/ ROTATOR CUFF REPAIR Right        Family History   Problem Relation Age of Onset   • No Known Problems Mother    • Cancer Father          Medications have been verified. Objective   /68   Pulse 69   Temp (!) 97.3 °F (36.3 °C)   Resp 18   SpO2 97%        Physical Exam     Physical Exam  Constitutional:       Appearance: She is well-developed.    HENT: Head: Normocephalic. Cardiovascular:      Rate and Rhythm: Normal rate and regular rhythm. Heart sounds: Normal heart sounds. Pulmonary:      Effort: Pulmonary effort is normal.      Breath sounds: Normal breath sounds. Abdominal:      General: Abdomen is flat. Bowel sounds are normal.      Palpations: Abdomen is soft. Tenderness: There is no abdominal tenderness. There is no right CVA tenderness or left CVA tenderness. Negative signs include Myrick's sign and McBurney's sign. Skin:     General: Skin is warm and dry. Capillary Refill: Capillary refill takes less than 2 seconds. Neurological:      General: No focal deficit present. Mental Status: She is alert.    Psychiatric:         Mood and Affect: Mood normal.         Behavior: Behavior normal.

## 2023-09-02 NOTE — TELEPHONE ENCOUNTER
Finesse Marc is an 78-year-old female who presented to [address] emergency room with flank pain found to have 8 mm distal calculus without fever, chills, dysuria, gross hematuria, infected urine, leukocytosis or acute kidney injury. Pain was adequately controlled. Deemed stable for discharge and close urologic follow-up. Please contact patient with office visit next available within 10 days. Thank you.

## 2023-09-04 LAB — BACTERIA UR CULT: NORMAL

## 2023-10-16 RX ORDER — ANTIOX #8/OM3/DHA/EPA/LUT/ZEAX 250-2.5 MG
1 CAPSULE ORAL DAILY
COMMUNITY

## 2023-10-16 RX ORDER — CYANOCOBALAMIN 1000 UG/ML
1000 INJECTION, SOLUTION INTRAMUSCULAR; SUBCUTANEOUS
COMMUNITY
Start: 2023-08-25

## 2023-10-17 PROBLEM — N20.1 LEFT URETERAL STONE: Status: ACTIVE | Noted: 2023-10-17

## 2023-10-18 ENCOUNTER — HOSPITAL ENCOUNTER (OUTPATIENT)
Dept: RADIOLOGY | Facility: CLINIC | Age: 78
Discharge: HOME | End: 2023-10-18
Payer: MEDICARE

## 2023-10-18 ENCOUNTER — OFFICE VISIT (OUTPATIENT)
Dept: UROLOGY | Facility: CLINIC | Age: 78
End: 2023-10-18
Payer: MEDICARE

## 2023-10-18 VITALS
TEMPERATURE: 97.8 F | HEIGHT: 62 IN | BODY MASS INDEX: 25.54 KG/M2 | DIASTOLIC BLOOD PRESSURE: 76 MMHG | OXYGEN SATURATION: 96 % | HEART RATE: 72 BPM | SYSTOLIC BLOOD PRESSURE: 144 MMHG | WEIGHT: 138.8 LBS

## 2023-10-18 DIAGNOSIS — R80.9 TYPE 2 DIABETES MELLITUS WITH MICROALBUMINURIA, WITHOUT LONG-TERM CURRENT USE OF INSULIN: ICD-10-CM

## 2023-10-18 DIAGNOSIS — N20.1 LEFT URETERAL STONE: Primary | ICD-10-CM

## 2023-10-18 DIAGNOSIS — E11.29 TYPE 2 DIABETES MELLITUS WITH MICROALBUMINURIA, WITHOUT LONG-TERM CURRENT USE OF INSULIN: ICD-10-CM

## 2023-10-18 DIAGNOSIS — N20.1 LEFT URETERAL STONE: ICD-10-CM

## 2023-10-18 LAB
SL AMB  POCT GLUCOSE, UA: ABNORMAL
SL AMB LEUKOCYTE ESTERASE,UA: ABNORMAL
SL AMB POCT BILIRUBIN,UA: ABNORMAL
SL AMB POCT BLOOD,UA: ABNORMAL
SL AMB POCT CLARITY,UA: CLEAR
SL AMB POCT COLOR,UA: YELLOW
SL AMB POCT KETONES,UA: ABNORMAL
SL AMB POCT NITRITE,UA: ABNORMAL
SL AMB POCT PH,UA: 5
SL AMB POCT SPECIFIC GRAVITY,UA: 1.02
SL AMB POCT URINE PROTEIN: ABNORMAL
SL AMB POCT UROBILINOGEN: 0.2

## 2023-10-18 PROCEDURE — 99203 OFFICE O/P NEW LOW 30 MIN: CPT | Performed by: UROLOGY

## 2023-10-18 PROCEDURE — 74018 RADEX ABDOMEN 1 VIEW: CPT

## 2023-10-18 PROCEDURE — 81003 URINALYSIS AUTO W/O SCOPE: CPT | Performed by: UROLOGY

## 2023-10-18 RX ORDER — TAMSULOSIN HYDROCHLORIDE 0.4 MG/1
0.4 CAPSULE ORAL
Qty: 30 CAPSULE | Refills: 0 | Status: SHIPPED | OUTPATIENT
Start: 2023-10-18 | End: 2023-11-17

## 2023-10-18 NOTE — PROGRESS NOTES
UROLOGY PROGRESS NOTE         NAME: Reshma Gutierrez  AGE: 78 y.o. SEX: female  : 1945   MRN: [id number]    DATE: 10/18/2023  TIME: 3:04 PM    Assessment and Plan      Impression:   1. Left ureteral stone  -     POCT urine dip auto non-scope  -     XR abdomen 1 view kub; Future; Expected date: 10/18/2023  -     tamsulosin (FLOMAX) 0.4 mg; Take 1 capsule (0.4 mg total) by mouth daily with dinner    2. Type 2 diabetes mellitus with microalbuminuria, without long-term current use of insulin          Plan: Patient is pain-free. Not sure if she passed her stone. This was an 8 mm stone. Unlikely that this is possible. Her urinalysis was unremarkable today. We will going to check a KUB x-ray and I will see her back for a visit sometime soon. Discussed options if stone persists. She will call and go to the emergency room if she develops pain despite her medication, persistent nausea vomiting or fever. Chief Complaint     Chief Complaint   Patient presents with   • Flank Pain   • Abdominal Cramping     History of Present Illness     HPI: Reshma Gutierrez is a 78y.o. year old female who presents with a left distal ureteral stone noted in the emergency room in early September. Patient had an 8 mm distal left ureteral stone and had left abdominal pain lower quadrant at the time. This was what brought her to the emergency room. She has had no pain since based on her report. Some pressure over the bladder no dysuria. No hematuria.   No x-rays since the emergency room              The following portions of the patient's history were reviewed and updated as appropriate: allergies, current medications, past family history, past medical history, past social history, past surgical history and problem list.  Past Medical History:   Diagnosis Date   • Hyperlipidemia    • Hypertension    • Myocardial infarction [facility])    • Stroke [facility])      Past Surgical History:   Procedure Laterality Date   • CORONARY ANGIOPLASTY WITH STENT PLACEMENT     • LAPAROSCOPY     • SHOULDER ARTHROSCOPY W/ ROTATOR CUFF REPAIR Right      shoulder  Review of Systems     Const: Denies chills, fever and weight loss. CV: Denies chest pain. Resp: Denies SOB. GI: Denies abdominal pain, nausea and vomiting. : Denies symptoms other than stated above. Musculo: Denies back pain. Objective   /76 (BP Location: Left arm, Patient Position: Sitting, Cuff Size: Adult)   Pulse 72   Temp 97.8 °F (36.6 °C)   Ht 5' 1.5" (1.562 m)   Wt 63 kg (138 lb 12.8 oz)   SpO2 96%   BMI 25.80 kg/m²     Physical Exam  Const: Appears healthy and well developed. No signs of acute distress present. Resp: Respirations are regular and unlabored. CV: Rate is regular. Rhythm is regular. Abdomen: Abdomen is soft, nontender, and nondistended. Kidneys are not palpable. : Bladder not percussible or tender flank nontender. No abdominal tenderness. Psych: Patient's attitude is cooperative.  Mood is normal. Affect is normal.    Procedure   Procedures     Current Medications     Current Outpatient Medications:   •  amLODIPine-valsartan (EXFORGE) 5-160 MG per tablet, Take 1 tablet by mouth daily, Disp: , Rfl:   •  atorvastatin (LIPITOR) 80 mg tablet, Take 1 tablet by mouth every evening, Disp: , Rfl:   •  Cholecalciferol 25 MCG (1000 UT) tablet, Take 1 capsule by mouth daily, Disp: , Rfl:   •  clopidogrel (PLAVIX) 75 mg tablet, Take 75 mg by mouth daily, Disp: , Rfl:   •  cyanocobalamin 1,000 mcg/mL, Inject 1,000 mcg into a muscle every 30 (thirty) days, Disp: , Rfl:   •  metFORMIN (GLUCOPHAGE-XR) 500 mg 24 hr tablet, Take 500 mg by mouth 2 (two) times a day, Disp: , Rfl:   •  metoprolol tartrate (LOPRESSOR) 50 mg tablet, Take 50 mg by mouth 2 (two) times a day, Disp: , Rfl:   •  Multiple Vitamins-Minerals (PreserVision AREDS 2) CAPS, Take 1 capsule by mouth daily, Disp: , Rfl:   •  tamsulosin (FLOMAX) 0.4 mg, Take 1 capsule (0.4 mg total) by mouth daily with dinner, Disp: 30 capsule, Rfl: 0  •  omega-3-acid ethyl esters (LOVAZA) 1 g capsule, Take 1 g by mouth 2 (two) times a day (Patient not taking: Reported on 10/18/2023), Disp: , Rfl:   •  ondansetron (Zofran ODT) 4 mg disintegrating tablet, Take 1 tablet (4 mg total) by mouth every 6 (six) hours as needed for nausea or vomiting (Patient not taking: Reported on 10/18/2023), Disp: 20 tablet, Rfl: 0        Sameer Choi MD

## 2023-11-01 ENCOUNTER — OFFICE VISIT (OUTPATIENT)
Dept: UROLOGY | Facility: CLINIC | Age: 78
End: 2023-11-01
Payer: MEDICARE

## 2023-11-01 VITALS
WEIGHT: 138 LBS | BODY MASS INDEX: 26.06 KG/M2 | HEART RATE: 68 BPM | TEMPERATURE: 98 F | DIASTOLIC BLOOD PRESSURE: 68 MMHG | HEIGHT: 61 IN | OXYGEN SATURATION: 98 % | SYSTOLIC BLOOD PRESSURE: 106 MMHG

## 2023-11-01 DIAGNOSIS — N20.1 LEFT URETERAL STONE: Primary | ICD-10-CM

## 2023-11-01 LAB
SL AMB  POCT GLUCOSE, UA: ABNORMAL
SL AMB LEUKOCYTE ESTERASE,UA: ABNORMAL
SL AMB POCT BILIRUBIN,UA: ABNORMAL
SL AMB POCT BLOOD,UA: ABNORMAL
SL AMB POCT CLARITY,UA: CLEAR
SL AMB POCT COLOR,UA: YELLOW
SL AMB POCT KETONES,UA: ABNORMAL
SL AMB POCT NITRITE,UA: ABNORMAL
SL AMB POCT PH,UA: 5
SL AMB POCT SPECIFIC GRAVITY,UA: >=1.03
SL AMB POCT URINE PROTEIN: ABNORMAL
SL AMB POCT UROBILINOGEN: 0.2

## 2023-11-01 PROCEDURE — 99213 OFFICE O/P EST LOW 20 MIN: CPT | Performed by: UROLOGY

## 2023-11-01 PROCEDURE — 87086 URINE CULTURE/COLONY COUNT: CPT | Performed by: UROLOGY

## 2023-11-01 PROCEDURE — 81003 URINALYSIS AUTO W/O SCOPE: CPT | Performed by: UROLOGY

## 2023-11-01 RX ORDER — CEFUROXIME AXETIL 500 MG/1
500 TABLET ORAL EVERY 12 HOURS SCHEDULED
Qty: 14 TABLET | Refills: 0 | Status: SHIPPED | OUTPATIENT
Start: 2023-11-01 | End: 2023-11-08

## 2023-11-01 RX ORDER — TAMSULOSIN HYDROCHLORIDE 0.4 MG/1
0.4 CAPSULE ORAL
Qty: 30 CAPSULE | Refills: 3 | Status: SHIPPED | OUTPATIENT
Start: 2023-11-01

## 2023-11-01 RX ORDER — HYDROCODONE BITARTRATE AND ACETAMINOPHEN 5; 325 MG/1; MG/1
1 TABLET ORAL EVERY 6 HOURS PRN
Qty: 20 TABLET | Refills: 0 | Status: SHIPPED | OUTPATIENT
Start: 2023-11-01

## 2023-11-01 NOTE — PROGRESS NOTES
UROLOGY PROGRESS NOTE         NAME: Romulo Buck  AGE: 78 y.o. SEX: female  : 1945   MRN: [id number]    DATE: 2023  TIME: 3:08 PM    Assessment and Plan      Impression:   1. Left ureteral stone  -     XR abdomen 1 view kub; Future; Expected date: 2023  -     cefuroxime (CEFTIN) 500 mg tablet; Take 1 tablet (500 mg total) by mouth every 12 (twelve) hours for 7 days  -     tamsulosin (FLOMAX) 0.4 mg; Take 1 capsule (0.4 mg total) by mouth daily with dinner  -     HYDROcodone-acetaminophen (Norco) 5-325 mg per tablet; Take 1 tablet by mouth every 6 (six) hours as needed for pain Max Daily Amount: 4 tablets  -     Urine culture; Future  -     Urine culture  -     POCT urine dip auto non-scope       Plan: Patient has an upcoming trip in 1 to 2 weeks. She has no pain or symptoms presently. Her distal left ureteral stone persists based on her most recent KUB x-ray. She does not want a procedure or stent before her trip. We will recheck a KUB and see her back following her trip. She will have prescriptions for an antibiotic, pain medicine nausea medicine and tamsulosin. She will continue the tamsulosin for now. She understands she should call and go to the emergency room if she develops pain despite her medication persistent nausea vomiting or fever. I discussed in detail the option of ureteral stent placement along with ureteroscopy and laser lithotripsy. Chief Complaint     Chief Complaint   Patient presents with   • 2 week follow up     History of Present Illness     HPI: Romulo Buck is a 78y.o. year old female who presents with history of a distal left ureteral stone. This appears to be about 4 x 8 mm in the distal left ureter. It may be sitting in a pocket not causing any obstruction. She has not had any symptoms since she was in the emergency room a few weeks ago. Pain-free presently. No voiding symptoms no hematuria no fever.   No nausea or vomiting. The following portions of the patient's history were reviewed and updated as appropriate: allergies, current medications, past family history, past medical history, past social history, past surgical history and problem list.  Past Medical History:   Diagnosis Date   • Hyperlipidemia    • Hypertension    • Myocardial infarction ([address])    • Stroke [facility])      Past Surgical History:   Procedure Laterality Date   • CORONARY ANGIOPLASTY WITH STENT PLACEMENT     • LAPAROSCOPY     • SHOULDER ARTHROSCOPY W/ ROTATOR CUFF REPAIR Right      shoulder  Review of Systems     Const: Denies chills, fever and weight loss. CV: Denies chest pain. Resp: Denies SOB. GI: Denies abdominal pain, nausea and vomiting. : Denies symptoms other than stated above. Musculo: Denies back pain. Objective   /68   Pulse 68   Temp 98 °F (36.7 °C)   Ht 5' 1" (1.549 m)   Wt 62.6 kg (138 lb)   SpO2 98%   BMI 26.07 kg/m²     Physical Exam  Const: Appears healthy and well developed. No signs of acute distress present. Resp: Respirations are regular and unlabored. CV: Rate is regular. Rhythm is regular. Abdomen: Abdomen is soft, nontender, and nondistended. Kidneys are not palpable. : Abdomen and flank soft nontender no bladder tenderness. Psych: Patient's attitude is cooperative.  Mood is normal. Affect is normal.    Procedure   Procedures     Current Medications     Current Outpatient Medications:   •  amLODIPine-valsartan (EXFORGE) 5-160 MG per tablet, Take 1 tablet by mouth daily, Disp: , Rfl:   •  atorvastatin (LIPITOR) 80 mg tablet, Take 1 tablet by mouth every evening, Disp: , Rfl:   •  cefuroxime (CEFTIN) 500 mg tablet, Take 1 tablet (500 mg total) by mouth every 12 (twelve) hours for 7 days, Disp: 14 tablet, Rfl: 0  •  Cholecalciferol 25 MCG (1000 UT) tablet, Take 1 capsule by mouth daily, Disp: , Rfl:   •  clopidogrel (PLAVIX) 75 mg tablet, Take 75 mg by mouth daily, Disp: , Rfl:   • cyanocobalamin 1,000 mcg/mL, Inject 1,000 mcg into a muscle every 30 (thirty) days, Disp: , Rfl:   •  HYDROcodone-acetaminophen (Norco) 5-325 mg per tablet, Take 1 tablet by mouth every 6 (six) hours as needed for pain Max Daily Amount: 4 tablets, Disp: 20 tablet, Rfl: 0  •  metFORMIN (GLUCOPHAGE-XR) 500 mg 24 hr tablet, Take 500 mg by mouth 2 (two) times a day, Disp: , Rfl:   •  metoprolol tartrate (LOPRESSOR) 50 mg tablet, Take 50 mg by mouth 2 (two) times a day, Disp: , Rfl:   •  omega-3-acid ethyl esters (LOVAZA) 1 g capsule, Take 1 g by mouth 2 (two) times a day, Disp: , Rfl:   •  tamsulosin (FLOMAX) 0.4 mg, Take 1 capsule (0.4 mg total) by mouth daily with dinner, Disp: 30 capsule, Rfl: 0  •  tamsulosin (FLOMAX) 0.4 mg, Take 1 capsule (0.4 mg total) by mouth daily with dinner, Disp: 30 capsule, Rfl: 3  •  Multiple Vitamins-Minerals (PreserVision AREDS 2) CAPS, Take 1 capsule by mouth daily (Patient not taking: Reported on 11/1/2023), Disp: , Rfl:   •  ondansetron (Zofran ODT) 4 mg disintegrating tablet, Take 1 tablet (4 mg total) by mouth every 6 (six) hours as needed for nausea or vomiting (Patient not taking: Reported on 10/18/2023), Disp: 20 tablet, Rfl: 0        Marcia Cisse MD

## 2023-11-02 LAB — BACTERIA UR CULT: NORMAL

## 2023-11-20 ENCOUNTER — HOSPITAL ENCOUNTER (OUTPATIENT)
Dept: RADIOLOGY | Facility: CLINIC | Age: 78
Discharge: HOME | End: 2023-11-20
Payer: MEDICARE

## 2023-11-20 DIAGNOSIS — N20.1 LEFT URETERAL STONE: ICD-10-CM

## 2023-11-20 PROCEDURE — 74018 RADEX ABDOMEN 1 VIEW: CPT

## 2023-11-24 ENCOUNTER — OFFICE VISIT (OUTPATIENT)
Dept: UROLOGY | Facility: CLINIC | Age: 78
End: 2023-11-24
Payer: MEDICARE

## 2023-11-24 VITALS
TEMPERATURE: 97 F | HEART RATE: 57 BPM | OXYGEN SATURATION: 98 % | SYSTOLIC BLOOD PRESSURE: 108 MMHG | DIASTOLIC BLOOD PRESSURE: 66 MMHG | BODY MASS INDEX: 25.57 KG/M2 | WEIGHT: 135.4 LBS | HEIGHT: 61 IN

## 2023-11-24 DIAGNOSIS — N20.1 LEFT URETERAL STONE: Primary | ICD-10-CM

## 2023-11-24 PROCEDURE — 99213 OFFICE O/P EST LOW 20 MIN: CPT | Performed by: UROLOGY

## 2023-11-24 RX ORDER — NICOTINE POLACRILEX 2 MG
1 GUM BUCCAL DAILY
COMMUNITY

## 2023-11-24 NOTE — PROGRESS NOTES
UROLOGY PROGRESS NOTE         NAME: Javed Torres  AGE: 78 y.o. SEX: female  : 1945   MRN: [id number]    DATE: 2023  TIME: 11:24 AM    Assessment and Plan      Impression:   1. Left ureteral stone  -     Case request operating room: CYSTOSCOPY URETEROSCOPY WITH LITHOTRIPSY HOLMIUM LASER, RETROGRADE PYELOGRAM AND INSERTION STENT URETERAL; Standing  -     Case request operating room: CYSTOSCOPY URETEROSCOPY WITH LITHOTRIPSY HOLMIUM LASER, RETROGRADE PYELOGRAM AND INSERTION STENT URETERAL         Plan: I reviewed images with the patient. Patient has a persistent 7 mm or so stone in the distal left ureter. She has had no symptoms however. She denies any flank pain or hematuria no fever. No nausea. I reviewed options with her including continued MET versus proceeding with left ureteroscopy, laser lithotripsy and stent placement. She wants to move ahead since she is tried for about 8 weeks to pass the stone. She does want to wait until after the holidays. She understands that she should go to the emergency room if she develops pain despite her medication, persistent nausea vomiting or fever she will continue to strain her urine. The procedures risks limitations outcomes and alternatives were reviewed with her in complete detail and her questions were answered. Informed consent was obtained. Chief Complaint     Chief Complaint   Patient presents with   • Follow-up     History of Present Illness     HPI: Javed Torres is a 78y.o. year old female who presents with a distal left ureteral calculus approximately 7 mm in diameter. Patient denies any symptoms presently. She has had no pain since her last visit. She follows up with a KUB x-ray which we reviewed together. No hematuria.               The following portions of the patient's history were reviewed and updated as appropriate: allergies, current medications, past family history, past medical history, past social history, past surgical history and problem list.  Past Medical History:   Diagnosis Date   • Hyperlipidemia    • Hypertension    • Myocardial infarction ([address])    • Stroke [facility])      Past Surgical History:   Procedure Laterality Date   • CORONARY ANGIOPLASTY WITH STENT PLACEMENT     • LAPAROSCOPY     • SHOULDER ARTHROSCOPY W/ ROTATOR CUFF REPAIR Right      shoulder  Review of Systems     Const: Denies chills, fever and weight loss. CV: Denies chest pain. Resp: Denies SOB. GI: Denies abdominal pain, nausea and vomiting. : Denies symptoms other than stated above. Musculo: Denies back pain. Objective   /66   Pulse 57   Temp (!) 97 °F (36.1 °C)   Ht 5' 1" (1.549 m)   Wt 61.4 kg (135 lb 6.4 oz)   SpO2 98%   BMI 25.58 kg/m²     Physical Exam  Const: Appears healthy and well developed. No signs of acute distress present. Resp: Respirations are regular and unlabored. CV: Rate is regular. Rhythm is regular. Abdomen: Abdomen is soft, nontender, and nondistended. Kidneys are not palpable. : Not performed  Psych: Patient's attitude is cooperative.  Mood is normal. Affect is normal.    Procedure   Procedures     Current Medications     Current Outpatient Medications:   •  amLODIPine-valsartan (EXFORGE) 5-160 MG per tablet, Take 1 tablet by mouth daily 5-160mg, Disp: , Rfl:   •  atorvastatin (LIPITOR) 80 mg tablet, Take 1 tablet by mouth every evening, Disp: , Rfl:   •  Biotin 1 MG CAPS, Take 1 capsule by mouth daily, Disp: , Rfl:   •  Cholecalciferol 25 MCG (1000 UT) tablet, Take 1 capsule by mouth daily, Disp: , Rfl:   •  clopidogrel (PLAVIX) 75 mg tablet, Take 75 mg by mouth daily, Disp: , Rfl:   •  cyanocobalamin 1,000 mcg/mL, Inject 1,000 mcg into a muscle every 30 (thirty) days, Disp: , Rfl:   •  metFORMIN (GLUCOPHAGE-XR) 500 mg 24 hr tablet, Take 500 mg by mouth 2 (two) times a day, Disp: , Rfl:   •  omega-3-acid ethyl esters (LOVAZA) 1 g capsule, Take 1 g by mouth 2 (two) times a day, Disp: , Rfl:   • tamsulosin (FLOMAX) 0.4 mg, Take 1 capsule (0.4 mg total) by mouth daily with dinner, Disp: 30 capsule, Rfl: 3  •  HYDROcodone-acetaminophen (Norco) 5-325 mg per tablet, Take 1 tablet by mouth every 6 (six) hours as needed for pain Max Daily Amount: 4 tablets (Patient not taking: Reported on 11/24/2023), Disp: 20 tablet, Rfl: 0  •  metoprolol tartrate (LOPRESSOR) 50 mg tablet, Take 50 mg by mouth 2 (two) times a day (Patient not taking: Reported on 11/24/2023), Disp: , Rfl:   •  Multiple Vitamins-Minerals (PreserVision AREDS 2) CAPS, Take 1 capsule by mouth daily (Patient not taking: Reported on 11/1/2023), Disp: , Rfl:   •  ondansetron (Zofran ODT) 4 mg disintegrating tablet, Take 1 tablet (4 mg total) by mouth every 6 (six) hours as needed for nausea or vomiting (Patient not taking: Reported on 10/18/2023), Disp: 20 tablet, Rfl: 0  •  tamsulosin (FLOMAX) 0.4 mg, Take 1 capsule (0.4 mg total) by mouth daily with dinner, Disp: 30 capsule, Rfl: 0        Karon Enrique MD

## 2023-11-24 NOTE — H&P
UROLOGY PROGRESS NOTE         NAME: Gloria Stanton  AGE: 78 y.o. SEX: female  : 1945   MRN: [id number]    DATE: 2023  TIME: 11:24 AM    Assessment and Plan      Impression:   1. Left ureteral stone  -     Case request operating room: CYSTOSCOPY URETEROSCOPY WITH LITHOTRIPSY HOLMIUM LASER, RETROGRADE PYELOGRAM AND INSERTION STENT URETERAL; Standing  -     Case request operating room: CYSTOSCOPY URETEROSCOPY WITH LITHOTRIPSY HOLMIUM LASER, RETROGRADE PYELOGRAM AND INSERTION STENT URETERAL         Plan: I reviewed images with the patient. Patient has a persistent 7 mm or so stone in the distal left ureter. She has had no symptoms however. She denies any flank pain or hematuria no fever. No nausea. I reviewed options with her including continued MET versus proceeding with left ureteroscopy, laser lithotripsy and stent placement. She wants to move ahead since she is tried for about 8 weeks to pass the stone. She does want to wait until after the holidays. She understands that she should go to the emergency room if she develops pain despite her medication, persistent nausea vomiting or fever she will continue to strain her urine. The procedures risks limitations outcomes and alternatives were reviewed with her in complete detail and her questions were answered. Informed consent was obtained. Chief Complaint     Chief Complaint   Patient presents with   • Follow-up     History of Present Illness     HPI: Gloria Stanton is a 78y.o. year old female who presents with a distal left ureteral calculus approximately 7 mm in diameter. Patient denies any symptoms presently. She has had no pain since her last visit. She follows up with a KUB x-ray which we reviewed together. No hematuria.               The following portions of the patient's history were reviewed and updated as appropriate: allergies, current medications, past family history, past medical history, past social history, past surgical history and problem list.  Past Medical History:   Diagnosis Date   • Hyperlipidemia    • Hypertension    • Myocardial infarction ([address])    • Stroke [facility])      Past Surgical History:   Procedure Laterality Date   • CORONARY ANGIOPLASTY WITH STENT PLACEMENT     • LAPAROSCOPY     • SHOULDER ARTHROSCOPY W/ ROTATOR CUFF REPAIR Right      shoulder  Review of Systems     Const: Denies chills, fever and weight loss. CV: Denies chest pain. Resp: Denies SOB. GI: Denies abdominal pain, nausea and vomiting. : Denies symptoms other than stated above. Musculo: Denies back pain. Objective   /66   Pulse 57   Temp (!) 97 °F (36.1 °C)   Ht 5' 1" (1.549 m)   Wt 61.4 kg (135 lb 6.4 oz)   SpO2 98%   BMI 25.58 kg/m²     Physical Exam  Const: Appears healthy and well developed. No signs of acute distress present. Resp: Respirations are regular and unlabored. CV: Rate is regular. Rhythm is regular. Abdomen: Abdomen is soft, nontender, and nondistended. Kidneys are not palpable. : Not performed  Psych: Patient's attitude is cooperative.  Mood is normal. Affect is normal.    Procedure   Procedures     Current Medications     Current Outpatient Medications:   •  amLODIPine-valsartan (EXFORGE) 5-160 MG per tablet, Take 1 tablet by mouth daily 5-160mg, Disp: , Rfl:   •  atorvastatin (LIPITOR) 80 mg tablet, Take 1 tablet by mouth every evening, Disp: , Rfl:   •  Biotin 1 MG CAPS, Take 1 capsule by mouth daily, Disp: , Rfl:   •  Cholecalciferol 25 MCG (1000 UT) tablet, Take 1 capsule by mouth daily, Disp: , Rfl:   •  clopidogrel (PLAVIX) 75 mg tablet, Take 75 mg by mouth daily, Disp: , Rfl:   •  cyanocobalamin 1,000 mcg/mL, Inject 1,000 mcg into a muscle every 30 (thirty) days, Disp: , Rfl:   •  metFORMIN (GLUCOPHAGE-XR) 500 mg 24 hr tablet, Take 500 mg by mouth 2 (two) times a day, Disp: , Rfl:   •  omega-3-acid ethyl esters (LOVAZA) 1 g capsule, Take 1 g by mouth 2 (two) times a day, Disp: , Rfl:   • tamsulosin (FLOMAX) 0.4 mg, Take 1 capsule (0.4 mg total) by mouth daily with dinner, Disp: 30 capsule, Rfl: 3  •  HYDROcodone-acetaminophen (Norco) 5-325 mg per tablet, Take 1 tablet by mouth every 6 (six) hours as needed for pain Max Daily Amount: 4 tablets (Patient not taking: Reported on 11/24/2023), Disp: 20 tablet, Rfl: 0  •  metoprolol tartrate (LOPRESSOR) 50 mg tablet, Take 50 mg by mouth 2 (two) times a day (Patient not taking: Reported on 11/24/2023), Disp: , Rfl:   •  Multiple Vitamins-Minerals (PreserVision AREDS 2) CAPS, Take 1 capsule by mouth daily (Patient not taking: Reported on 11/1/2023), Disp: , Rfl:   •  ondansetron (Zofran ODT) 4 mg disintegrating tablet, Take 1 tablet (4 mg total) by mouth every 6 (six) hours as needed for nausea or vomiting (Patient not taking: Reported on 10/18/2023), Disp: 20 tablet, Rfl: 0  •  tamsulosin (FLOMAX) 0.4 mg, Take 1 capsule (0.4 mg total) by mouth daily with dinner, Disp: 30 capsule, Rfl: 0        Trent Luna MD

## 2023-12-22 ENCOUNTER — TELEPHONE (OUTPATIENT)
Dept: UROLOGY | Facility: CLINIC | Age: 78
End: 2023-12-22

## 2023-12-22 NOTE — TELEPHONE ENCOUNTER
Called pt, to check if she passed stone, per  doesn't think so. He will have her c/b to schedule OR

## 2023-12-27 NOTE — TELEPHONE ENCOUNTER
Called again and spoke to pt. Not certain if she passed stone and was not straining urine every time. Pt prefers not to have surgery. Will check with Dr Mckeon for any imaging to be done prior to scheduling OR

## 2024-01-12 ENCOUNTER — TELEPHONE (OUTPATIENT)
Age: 79
End: 2024-01-12

## 2024-01-12 NOTE — TELEPHONE ENCOUNTER
Pt called stating she is to set up virtual/phone visit with  1 week after her 01/18/24 CT Scan she will not be available 01/25/24 between 9am-10:30,

## 2024-01-18 ENCOUNTER — HOSPITAL ENCOUNTER (OUTPATIENT)
Dept: CT IMAGING | Facility: HOSPITAL | Age: 79
End: 2024-01-18
Attending: UROLOGY
Payer: MEDICARE

## 2024-01-18 DIAGNOSIS — N20.1 LEFT URETERAL STONE: ICD-10-CM

## 2024-01-18 PROCEDURE — 74176 CT ABD & PELVIS W/O CONTRAST: CPT

## 2024-01-18 PROCEDURE — G1004 CDSM NDSC: HCPCS

## 2024-01-22 ENCOUNTER — TELEPHONE (OUTPATIENT)
Dept: PODIATRY | Age: 79
End: 2024-01-22

## 2024-01-22 NOTE — TELEPHONE ENCOUNTER
----- Message from Shamir Mckeon MD sent at 1/21/2024  8:30 PM EST -----  Patient has appointment scheduled January 23 to go over results and discuss surgery

## 2024-01-23 ENCOUNTER — TELEMEDICINE (OUTPATIENT)
Dept: UROLOGY | Facility: CLINIC | Age: 79
End: 2024-01-23
Payer: MEDICARE

## 2024-01-23 VITALS — SYSTOLIC BLOOD PRESSURE: 114 MMHG | DIASTOLIC BLOOD PRESSURE: 61 MMHG | HEART RATE: 68 BPM

## 2024-01-23 DIAGNOSIS — N20.1 LEFT URETERAL STONE: Primary | ICD-10-CM

## 2024-01-23 PROCEDURE — 99441 PR PHYS/QHP TELEPHONE EVALUATION 5-10 MIN: CPT | Performed by: UROLOGY

## 2024-01-23 NOTE — PROGRESS NOTES
UROLOGY PROGRESS NOTE         NAME: Sylvia Jordan  AGE: 78 y.o. SEX: female  : 1945   MRN: [id number]    DATE: 2024  TIME: 12:44 PM    Assessment and Plan      Impression:   1. Left ureteral stone         Plan: I discussed CT findings with patient.  She has an 8 mm distal left ureteral stone.  She is having no pain.  It is not obstructing her at this point.  Apparently she has a lump that she is having an ultrasound and possibly a biopsy.  She states that this is her more urgent issue medically at this point.  she is willing to come back for a visit in a few weeks to discuss.  I reviewed treatment options with her regarding the stone.  We recommended left ureteroscopy and laser lithotripsy with stent placement.  She would like to think about this further and is concerned about other health issues presently.  I will see her back in 2 to 3 weeks for further discussion.  Her questions were answered.  She understands that if she develops pain despite her medication, persistent nausea vomiting or fever that she must go to emergency room promptly.          Chief Complaint     Chief Complaint   Patient presents with    Follow-up     Review CT renal stone study.     History of Present Illness     HPI: Sylvia Jordan is a 78 y.o. year old female who presents with a left ureteral calculus 8 mm on recent CT scan no pain presently.  No gross hematuria.  No fever.  No nausea vomiting.    This was a telephone encounter visit.  I identified the patient at the beginning of our phone call and received permission to medically treat by phone.  I confirmed that the patient was at home at her residence in [place].  I was behind close doors in my office at the time of the phone call to ensure confidentiality.  The total time of the phone visit was 8 minutes          The following portions of the patient's history were reviewed and updated as appropriate: allergies, current medications, past family history,  past medical history, past social history, past surgical history and problem list.  Past Medical History:   Diagnosis Date    Hyperlipidemia     Hypertension     Myocardial infarction (HCC)     Stroke (HCC)      Past Surgical History:   Procedure Laterality Date    CORONARY ANGIOPLASTY WITH STENT PLACEMENT      LAPAROSCOPY      SHOULDER ARTHROSCOPY W/ ROTATOR CUFF REPAIR Right      shoulder  Review of Systems     Const: Denies chills, fever and weight loss.  CV: Denies chest pain.  Resp: Denies SOB.  GI: Denies abdominal pain, nausea and vomiting.  : Denies symptoms other than stated above.  Musculo: Denies back pain.    Objective   /61 Comment: taken at home  Pulse 68 Comment: taken at home        Procedure   Procedures     Current Medications     Current Outpatient Medications:     amLODIPine-valsartan (EXFORGE) 5-160 MG per tablet, Take 1 tablet by mouth daily 5-160mg, Disp: , Rfl:     atorvastatin (LIPITOR) 80 mg tablet, Take 1 tablet by mouth every evening, Disp: , Rfl:     Biotin 1 MG CAPS, Take 1 capsule by mouth daily, Disp: , Rfl:     Cholecalciferol 25 MCG (1000 UT) tablet, Take 1 capsule by mouth daily, Disp: , Rfl:     clopidogrel (PLAVIX) 75 mg tablet, Take 75 mg by mouth daily, Disp: , Rfl:     cyanocobalamin 1,000 mcg/mL, Inject 1,000 mcg into a muscle every 30 (thirty) days, Disp: , Rfl:     Multiple Vitamins-Minerals (PreserVision AREDS 2) CAPS, Take 2 capsules by mouth daily, Disp: , Rfl:     omega-3-acid ethyl esters (LOVAZA) 1 g capsule, Take 1 g by mouth 2 (two) times a day, Disp: , Rfl:     tamsulosin (FLOMAX) 0.4 mg, Take 1 capsule (0.4 mg total) by mouth daily with dinner, Disp: 30 capsule, Rfl: 3    HYDROcodone-acetaminophen (Norco) 5-325 mg per tablet, Take 1 tablet by mouth every 6 (six) hours as needed for pain Max Daily Amount: 4 tablets (Patient not taking: Reported on 11/24/2023), Disp: 20 tablet, Rfl: 0    metFORMIN (GLUCOPHAGE-XR) 500 mg 24 hr tablet, Take 500 mg by mouth 2  (two) times a day (Patient not taking: Reported on 1/23/2024), Disp: , Rfl:     metoprolol tartrate (LOPRESSOR) 50 mg tablet, Take 50 mg by mouth 2 (two) times a day (Patient not taking: Reported on 11/24/2023), Disp: , Rfl:     ondansetron (Zofran ODT) 4 mg disintegrating tablet, Take 1 tablet (4 mg total) by mouth every 6 (six) hours as needed for nausea or vomiting (Patient not taking: Reported on 10/18/2023), Disp: 20 tablet, Rfl: 0    tamsulosin (FLOMAX) 0.4 mg, Take 1 capsule (0.4 mg total) by mouth daily with dinner, Disp: 30 capsule, Rfl: 0        Elke Mckeon MD

## 2024-01-31 DIAGNOSIS — N20.1 LEFT URETERAL STONE: ICD-10-CM

## 2024-01-31 NOTE — TELEPHONE ENCOUNTER
Received fax from [facility] to refill Tamsulosin 0.4 mg daily. Order has been pended for refill. Spoke with patient and she stated if she is in need of the medication she would like it sent to [facility].

## 2024-02-02 RX ORDER — TAMSULOSIN HYDROCHLORIDE 0.4 MG/1
0.4 CAPSULE ORAL
Qty: 90 CAPSULE | Refills: 3 | Status: SHIPPED | OUTPATIENT
Start: 2024-02-02

## 2024-03-06 ENCOUNTER — TELEPHONE (OUTPATIENT)
Dept: UROLOGY | Facility: CLINIC | Age: 79
End: 2024-03-06

## 2024-03-06 DIAGNOSIS — N20.1 LEFT URETERAL STONE: Primary | ICD-10-CM

## 2024-03-06 NOTE — TELEPHONE ENCOUNTER
Spoke with pt, she will get the KUB and call us back to schedule her follow up with Dr. Mckeon.  She is going for many tests soon as recommended by Oncology to rule out cancer.   She made a note to her self to call us for the follow up appt. With Dr. Mckeon.  Verbalized understanding of Dr. Armando recommendations. Nothing else needed at this time.

## 2024-03-06 NOTE — TELEPHONE ENCOUNTER
----- Message from Shamir Mckeon MD sent at 3/6/2024  1:54 PM EST -----  Based on most recent CT scan showing an 8 mm and likely 2 to 3 mm stone in the distal left ureter, I would recommend she proceed with cystoscopy left ureteroscopy laser lithotripsy and stent placement as I had discussed with her previously.  I would recommend also KUB x-ray which could be done soon.  I am happy to see her back for a follow-up visit in the office to go over the surgery again and review imaging with her.  I put another order in for a KUB x-ray    Thank you

## 2024-03-28 ENCOUNTER — TELEPHONE (OUTPATIENT)
Dept: OBGYN CLINIC | Facility: CLINIC | Age: 79
End: 2024-03-28